# Patient Record
Sex: FEMALE | Race: WHITE | Employment: OTHER | URBAN - METROPOLITAN AREA
[De-identification: names, ages, dates, MRNs, and addresses within clinical notes are randomized per-mention and may not be internally consistent; named-entity substitution may affect disease eponyms.]

---

## 2017-03-06 ENCOUNTER — HOSPITAL ENCOUNTER (OUTPATIENT)
Dept: MAMMOGRAPHY | Facility: CLINIC | Age: 77
Discharge: HOME | End: 2017-03-06
Attending: INTERNAL MEDICINE | Admitting: INTERNAL MEDICINE
Payer: MEDICARE

## 2017-03-06 ENCOUNTER — HOSPITAL ENCOUNTER (OUTPATIENT)
Dept: BONE DENSITY | Facility: CLINIC | Age: 77
End: 2017-03-06
Attending: INTERNAL MEDICINE
Payer: MEDICARE

## 2017-03-06 DIAGNOSIS — M85.88 OTHER SPECIFIED DISORDERS OF BONE DENSITY AND STRUCTURE, OTHER SITE: ICD-10-CM

## 2017-03-06 DIAGNOSIS — Z12.31 VISIT FOR SCREENING MAMMOGRAM: ICD-10-CM

## 2017-03-06 PROCEDURE — 77080 DXA BONE DENSITY AXIAL: CPT

## 2017-03-06 PROCEDURE — G0202 SCR MAMMO BI INCL CAD: HCPCS

## 2018-03-14 ENCOUNTER — HOSPITAL ENCOUNTER (OUTPATIENT)
Dept: MAMMOGRAPHY | Facility: CLINIC | Age: 78
Discharge: HOME | End: 2018-03-14
Attending: INTERNAL MEDICINE | Admitting: INTERNAL MEDICINE
Payer: MEDICARE

## 2018-03-14 DIAGNOSIS — Z12.31 ENCOUNTER FOR SCREENING MAMMOGRAM FOR HIGH-RISK PATIENT: ICD-10-CM

## 2018-03-14 PROCEDURE — 77063 BREAST TOMOSYNTHESIS BI: CPT

## 2019-03-18 ENCOUNTER — HOSPITAL ENCOUNTER (OUTPATIENT)
Dept: MAMMOGRAPHY | Facility: CLINIC | Age: 79
Discharge: HOME | End: 2019-03-18
Attending: INTERNAL MEDICINE | Admitting: INTERNAL MEDICINE
Payer: MEDICARE

## 2019-03-18 DIAGNOSIS — Z12.31 SCREENING MAMMOGRAM, ENCOUNTER FOR: ICD-10-CM

## 2019-03-18 PROCEDURE — 77063 BREAST TOMOSYNTHESIS BI: CPT

## 2020-05-26 ENCOUNTER — HOSPITAL ENCOUNTER (OUTPATIENT)
Dept: MAMMOGRAPHY | Facility: CLINIC | Age: 80
Discharge: HOME | End: 2020-05-26
Attending: INTERNAL MEDICINE | Admitting: INTERNAL MEDICINE
Payer: MEDICARE

## 2020-05-26 DIAGNOSIS — Z12.31 VISIT FOR SCREENING MAMMOGRAM: ICD-10-CM

## 2020-05-26 PROCEDURE — 77067 SCR MAMMO BI INCL CAD: CPT

## 2020-08-10 ENCOUNTER — APPOINTMENT (OUTPATIENT)
Dept: CT IMAGING | Facility: CLINIC | Age: 80
DRG: 164 | End: 2020-08-10
Attending: EMERGENCY MEDICINE
Payer: MEDICARE

## 2020-08-10 ENCOUNTER — APPOINTMENT (OUTPATIENT)
Dept: GENERAL RADIOLOGY | Facility: CLINIC | Age: 80
DRG: 164 | End: 2020-08-10
Attending: EMERGENCY MEDICINE
Payer: MEDICARE

## 2020-08-10 ENCOUNTER — HOSPITAL ENCOUNTER (INPATIENT)
Facility: CLINIC | Age: 80
LOS: 4 days | Discharge: HOME | DRG: 164 | End: 2020-08-14
Attending: EMERGENCY MEDICINE | Admitting: INTERNAL MEDICINE
Payer: MEDICARE

## 2020-08-10 ENCOUNTER — APPOINTMENT (OUTPATIENT)
Dept: ULTRASOUND IMAGING | Facility: CLINIC | Age: 80
DRG: 164 | End: 2020-08-10
Attending: INTERNAL MEDICINE
Payer: MEDICARE

## 2020-08-10 DIAGNOSIS — I82.4Z1 ACUTE DEEP VEIN THROMBOSIS (DVT) OF DISTAL VEIN OF RIGHT LOWER EXTREMITY (H): ICD-10-CM

## 2020-08-10 DIAGNOSIS — R06.09 DYSPNEA ON EXERTION: ICD-10-CM

## 2020-08-10 DIAGNOSIS — I26.99 BILATERAL PULMONARY EMBOLISM (H): Primary | ICD-10-CM

## 2020-08-10 LAB
ANION GAP SERPL CALCULATED.3IONS-SCNC: 5 MMOL/L (ref 3–14)
BASOPHILS # BLD AUTO: 0 10E9/L (ref 0–0.2)
BASOPHILS NFR BLD AUTO: 0.2 %
BUN SERPL-MCNC: 15 MG/DL (ref 7–30)
CALCIUM SERPL-MCNC: 8.1 MG/DL (ref 8.5–10.1)
CHLORIDE SERPL-SCNC: 106 MMOL/L (ref 94–109)
CO2 SERPL-SCNC: 27 MMOL/L (ref 20–32)
CREAT SERPL-MCNC: 0.67 MG/DL (ref 0.52–1.04)
D DIMER PPP FEU-MCNC: 3.8 UG/ML FEU (ref 0–0.5)
DIFFERENTIAL METHOD BLD: ABNORMAL
EOSINOPHIL # BLD AUTO: 0.3 10E9/L (ref 0–0.7)
EOSINOPHIL NFR BLD AUTO: 2.9 %
ERYTHROCYTE [DISTWIDTH] IN BLOOD BY AUTOMATED COUNT: 12.1 % (ref 10–15)
GFR SERPL CREATININE-BSD FRML MDRD: 84 ML/MIN/{1.73_M2}
GLUCOSE SERPL-MCNC: 96 MG/DL (ref 70–99)
HCT VFR BLD AUTO: 36.9 % (ref 35–47)
HGB BLD-MCNC: 12.2 G/DL (ref 11.7–15.7)
IMM GRANULOCYTES # BLD: 0 10E9/L (ref 0–0.4)
IMM GRANULOCYTES NFR BLD: 0.2 %
INTERPRETATION ECG - MUSE: NORMAL
LYMPHOCYTES # BLD AUTO: 1.7 10E9/L (ref 0.8–5.3)
LYMPHOCYTES NFR BLD AUTO: 19.1 %
MCH RBC QN AUTO: 33.2 PG (ref 26.5–33)
MCHC RBC AUTO-ENTMCNC: 33.1 G/DL (ref 31.5–36.5)
MCV RBC AUTO: 101 FL (ref 78–100)
MONOCYTES # BLD AUTO: 0.6 10E9/L (ref 0–1.3)
MONOCYTES NFR BLD AUTO: 6.6 %
NEUTROPHILS # BLD AUTO: 6.1 10E9/L (ref 1.6–8.3)
NEUTROPHILS NFR BLD AUTO: 71 %
NRBC # BLD AUTO: 0 10*3/UL
NRBC BLD AUTO-RTO: 0 /100
NT-PROBNP SERPL-MCNC: 3224 PG/ML (ref 0–1800)
PLATELET # BLD AUTO: 323 10E9/L (ref 150–450)
POTASSIUM SERPL-SCNC: 3.9 MMOL/L (ref 3.4–5.3)
RADIOLOGIST FLAGS: ABNORMAL
RBC # BLD AUTO: 3.67 10E12/L (ref 3.8–5.2)
SODIUM SERPL-SCNC: 138 MMOL/L (ref 133–144)
TROPONIN I SERPL-MCNC: 0.17 UG/L (ref 0–0.04)
WBC # BLD AUTO: 8.6 10E9/L (ref 4–11)

## 2020-08-10 PROCEDURE — 25000128 H RX IP 250 OP 636: Performed by: EMERGENCY MEDICINE

## 2020-08-10 PROCEDURE — 93970 EXTREMITY STUDY: CPT

## 2020-08-10 PROCEDURE — 99223 1ST HOSP IP/OBS HIGH 75: CPT | Mod: AI | Performed by: INTERNAL MEDICINE

## 2020-08-10 PROCEDURE — 96365 THER/PROPH/DIAG IV INF INIT: CPT | Mod: 59

## 2020-08-10 PROCEDURE — 96366 THER/PROPH/DIAG IV INF ADDON: CPT

## 2020-08-10 PROCEDURE — 80048 BASIC METABOLIC PNL TOTAL CA: CPT | Performed by: EMERGENCY MEDICINE

## 2020-08-10 PROCEDURE — 93005 ELECTROCARDIOGRAM TRACING: CPT

## 2020-08-10 PROCEDURE — 96376 TX/PRO/DX INJ SAME DRUG ADON: CPT

## 2020-08-10 PROCEDURE — 85025 COMPLETE CBC W/AUTO DIFF WBC: CPT | Performed by: EMERGENCY MEDICINE

## 2020-08-10 PROCEDURE — 84484 ASSAY OF TROPONIN QUANT: CPT | Performed by: EMERGENCY MEDICINE

## 2020-08-10 PROCEDURE — 12000000 ZZH R&B MED SURG/OB

## 2020-08-10 PROCEDURE — 71046 X-RAY EXAM CHEST 2 VIEWS: CPT

## 2020-08-10 PROCEDURE — 25000125 ZZHC RX 250: Performed by: EMERGENCY MEDICINE

## 2020-08-10 PROCEDURE — 25000132 ZZH RX MED GY IP 250 OP 250 PS 637: Mod: GY | Performed by: EMERGENCY MEDICINE

## 2020-08-10 PROCEDURE — 99285 EMERGENCY DEPT VISIT HI MDM: CPT | Mod: 25

## 2020-08-10 PROCEDURE — 83880 ASSAY OF NATRIURETIC PEPTIDE: CPT | Performed by: EMERGENCY MEDICINE

## 2020-08-10 PROCEDURE — U0003 INFECTIOUS AGENT DETECTION BY NUCLEIC ACID (DNA OR RNA); SEVERE ACUTE RESPIRATORY SYNDROME CORONAVIRUS 2 (SARS-COV-2) (CORONAVIRUS DISEASE [COVID-19]), AMPLIFIED PROBE TECHNIQUE, MAKING USE OF HIGH THROUGHPUT TECHNOLOGIES AS DESCRIBED BY CMS-2020-01-R: HCPCS | Performed by: EMERGENCY MEDICINE

## 2020-08-10 PROCEDURE — 71275 CT ANGIOGRAPHY CHEST: CPT

## 2020-08-10 PROCEDURE — 85379 FIBRIN DEGRADATION QUANT: CPT | Performed by: EMERGENCY MEDICINE

## 2020-08-10 RX ORDER — MECLIZINE HYDROCHLORIDE 25 MG/1
25 TABLET ORAL 2 TIMES DAILY PRN
COMMUNITY

## 2020-08-10 RX ORDER — CHLORAL HYDRATE 500 MG
1 CAPSULE ORAL DAILY
COMMUNITY

## 2020-08-10 RX ORDER — BUTALBITAL, ACETAMINOPHEN AND CAFFEINE 50; 325; 40 MG/1; MG/1; MG/1
1 TABLET ORAL 3 TIMES DAILY PRN
Status: ON HOLD | COMMUNITY
End: 2020-08-11

## 2020-08-10 RX ORDER — CARVEDILOL 6.25 MG/1
6.25 TABLET ORAL 2 TIMES DAILY WITH MEALS
COMMUNITY

## 2020-08-10 RX ORDER — BUTALBITAL, ACETAMINOPHEN, CAFFEINE AND CODEINE PHOSPHATE 50; 325; 40; 30 MG/1; MG/1; MG/1; MG/1
1 CAPSULE ORAL 2 TIMES DAILY PRN
COMMUNITY

## 2020-08-10 RX ORDER — OXYBUTYNIN CHLORIDE 10 MG/1
10 TABLET, EXTENDED RELEASE ORAL DAILY
COMMUNITY

## 2020-08-10 RX ORDER — ONDANSETRON 4 MG/1
4 TABLET, FILM COATED ORAL EVERY 8 HOURS PRN
COMMUNITY

## 2020-08-10 RX ORDER — ASPIRIN 81 MG/1
324 TABLET, CHEWABLE ORAL ONCE
Status: COMPLETED | OUTPATIENT
Start: 2020-08-10 | End: 2020-08-10

## 2020-08-10 RX ORDER — ATORVASTATIN CALCIUM 10 MG/1
10 TABLET, FILM COATED ORAL DAILY
COMMUNITY

## 2020-08-10 RX ORDER — IOPAMIDOL 755 MG/ML
56 INJECTION, SOLUTION INTRAVASCULAR ONCE
Status: COMPLETED | OUTPATIENT
Start: 2020-08-10 | End: 2020-08-10

## 2020-08-10 RX ORDER — ACETAMINOPHEN 500 MG
500 TABLET ORAL EVERY 6 HOURS PRN
Status: ON HOLD | COMMUNITY
End: 2020-08-11

## 2020-08-10 RX ORDER — AMLODIPINE BESYLATE 5 MG/1
5 TABLET ORAL 2 TIMES DAILY
COMMUNITY

## 2020-08-10 RX ORDER — HEPARIN SODIUM 10000 [USP'U]/100ML
950 INJECTION, SOLUTION INTRAVENOUS CONTINUOUS
Status: DISCONTINUED | OUTPATIENT
Start: 2020-08-10 | End: 2020-08-12

## 2020-08-10 RX ORDER — FAMOTIDINE 40 MG/1
40 TABLET, FILM COATED ORAL DAILY
Status: ON HOLD | COMMUNITY
End: 2020-08-11

## 2020-08-10 RX ADMIN — HEPARIN SODIUM 950 UNITS/HR: 10000 INJECTION, SOLUTION INTRAVENOUS at 21:57

## 2020-08-10 RX ADMIN — SODIUM CHLORIDE 83 ML: 9 INJECTION, SOLUTION INTRAVENOUS at 21:04

## 2020-08-10 RX ADMIN — ASPIRIN 81 MG 324 MG: 81 TABLET ORAL at 20:00

## 2020-08-10 RX ADMIN — Medication 4220 UNITS: at 21:57

## 2020-08-10 RX ADMIN — IOPAMIDOL 56 ML: 755 INJECTION, SOLUTION INTRAVENOUS at 21:04

## 2020-08-10 ASSESSMENT — ENCOUNTER SYMPTOMS
SHORTNESS OF BREATH: 1
COUGH: 0

## 2020-08-11 ENCOUNTER — APPOINTMENT (OUTPATIENT)
Dept: CARDIOLOGY | Facility: CLINIC | Age: 80
DRG: 164 | End: 2020-08-11
Attending: INTERNAL MEDICINE
Payer: MEDICARE

## 2020-08-11 PROBLEM — R06.02 SOB (SHORTNESS OF BREATH): Status: ACTIVE | Noted: 2020-08-11

## 2020-08-11 LAB
ANION GAP SERPL CALCULATED.3IONS-SCNC: 5 MMOL/L (ref 3–14)
BUN SERPL-MCNC: 11 MG/DL (ref 7–30)
CALCIUM SERPL-MCNC: 7.9 MG/DL (ref 8.5–10.1)
CHLORIDE SERPL-SCNC: 109 MMOL/L (ref 94–109)
CO2 SERPL-SCNC: 26 MMOL/L (ref 20–32)
CREAT SERPL-MCNC: 0.57 MG/DL (ref 0.52–1.04)
ERYTHROCYTE [DISTWIDTH] IN BLOOD BY AUTOMATED COUNT: 12.1 % (ref 10–15)
GFR SERPL CREATININE-BSD FRML MDRD: 88 ML/MIN/{1.73_M2}
GLUCOSE SERPL-MCNC: 95 MG/DL (ref 70–99)
HCT VFR BLD AUTO: 35.7 % (ref 35–47)
HGB BLD-MCNC: 11.8 G/DL (ref 11.7–15.7)
LABORATORY COMMENT REPORT: NORMAL
LMWH PPP CHRO-ACNC: 0.57 IU/ML
LMWH PPP CHRO-ACNC: 0.64 IU/ML
LMWH PPP CHRO-ACNC: 0.65 IU/ML
MCH RBC QN AUTO: 33 PG (ref 26.5–33)
MCHC RBC AUTO-ENTMCNC: 33.1 G/DL (ref 31.5–36.5)
MCV RBC AUTO: 100 FL (ref 78–100)
PLATELET # BLD AUTO: 334 10E9/L (ref 150–450)
POTASSIUM SERPL-SCNC: 3.9 MMOL/L (ref 3.4–5.3)
RBC # BLD AUTO: 3.58 10E12/L (ref 3.8–5.2)
SARS-COV-2 RNA SPEC QL NAA+PROBE: NEGATIVE
SARS-COV-2 RNA SPEC QL NAA+PROBE: NORMAL
SARS-COV-2 RNA SPEC QL NAA+PROBE: NOT DETECTED
SODIUM SERPL-SCNC: 140 MMOL/L (ref 133–144)
SPECIMEN SOURCE: NORMAL
WBC # BLD AUTO: 9.6 10E9/L (ref 4–11)

## 2020-08-11 PROCEDURE — 12000000 ZZH R&B MED SURG/OB

## 2020-08-11 PROCEDURE — 99223 1ST HOSP IP/OBS HIGH 75: CPT | Performed by: INTERNAL MEDICINE

## 2020-08-11 PROCEDURE — 85520 HEPARIN ASSAY: CPT | Performed by: EMERGENCY MEDICINE

## 2020-08-11 PROCEDURE — 85027 COMPLETE CBC AUTOMATED: CPT | Performed by: INTERNAL MEDICINE

## 2020-08-11 PROCEDURE — 85520 HEPARIN ASSAY: CPT | Performed by: INTERNAL MEDICINE

## 2020-08-11 PROCEDURE — U0003 INFECTIOUS AGENT DETECTION BY NUCLEIC ACID (DNA OR RNA); SEVERE ACUTE RESPIRATORY SYNDROME CORONAVIRUS 2 (SARS-COV-2) (CORONAVIRUS DISEASE [COVID-19]), AMPLIFIED PROBE TECHNIQUE, MAKING USE OF HIGH THROUGHPUT TECHNOLOGIES AS DESCRIBED BY CMS-2020-01-R: HCPCS | Performed by: PHYSICIAN ASSISTANT

## 2020-08-11 PROCEDURE — 93306 TTE W/DOPPLER COMPLETE: CPT

## 2020-08-11 PROCEDURE — 36415 COLL VENOUS BLD VENIPUNCTURE: CPT | Performed by: INTERNAL MEDICINE

## 2020-08-11 PROCEDURE — 25000132 ZZH RX MED GY IP 250 OP 250 PS 637: Mod: GY | Performed by: INTERNAL MEDICINE

## 2020-08-11 PROCEDURE — 25000128 H RX IP 250 OP 636: Performed by: INTERNAL MEDICINE

## 2020-08-11 PROCEDURE — 80048 BASIC METABOLIC PNL TOTAL CA: CPT | Performed by: INTERNAL MEDICINE

## 2020-08-11 PROCEDURE — 99233 SBSQ HOSP IP/OBS HIGH 50: CPT | Performed by: INTERNAL MEDICINE

## 2020-08-11 PROCEDURE — 93306 TTE W/DOPPLER COMPLETE: CPT | Mod: 26 | Performed by: INTERNAL MEDICINE

## 2020-08-11 RX ORDER — ONDANSETRON 4 MG/1
4 TABLET, ORALLY DISINTEGRATING ORAL EVERY 6 HOURS PRN
Status: DISCONTINUED | OUTPATIENT
Start: 2020-08-11 | End: 2020-08-14 | Stop reason: HOSPADM

## 2020-08-11 RX ORDER — PROCHLORPERAZINE 25 MG
12.5 SUPPOSITORY, RECTAL RECTAL EVERY 12 HOURS PRN
Status: DISCONTINUED | OUTPATIENT
Start: 2020-08-11 | End: 2020-08-14 | Stop reason: HOSPADM

## 2020-08-11 RX ORDER — ONDANSETRON 2 MG/ML
4 INJECTION INTRAMUSCULAR; INTRAVENOUS EVERY 6 HOURS PRN
Status: DISCONTINUED | OUTPATIENT
Start: 2020-08-11 | End: 2020-08-14 | Stop reason: HOSPADM

## 2020-08-11 RX ORDER — NALOXONE HYDROCHLORIDE 0.4 MG/ML
.1-.4 INJECTION, SOLUTION INTRAMUSCULAR; INTRAVENOUS; SUBCUTANEOUS
Status: DISCONTINUED | OUTPATIENT
Start: 2020-08-11 | End: 2020-08-14 | Stop reason: HOSPADM

## 2020-08-11 RX ORDER — ACETAMINOPHEN 325 MG/1
650 TABLET ORAL EVERY 4 HOURS PRN
Status: DISCONTINUED | OUTPATIENT
Start: 2020-08-11 | End: 2020-08-14 | Stop reason: HOSPADM

## 2020-08-11 RX ORDER — PROCHLORPERAZINE MALEATE 5 MG
5 TABLET ORAL EVERY 6 HOURS PRN
Status: DISCONTINUED | OUTPATIENT
Start: 2020-08-11 | End: 2020-08-14 | Stop reason: HOSPADM

## 2020-08-11 RX ORDER — LIDOCAINE 40 MG/G
CREAM TOPICAL
Status: DISCONTINUED | OUTPATIENT
Start: 2020-08-11 | End: 2020-08-14 | Stop reason: HOSPADM

## 2020-08-11 RX ADMIN — ACETAMINOPHEN 650 MG: 325 TABLET, FILM COATED ORAL at 18:30

## 2020-08-11 RX ADMIN — HEPARIN SODIUM 950 UNITS/HR: 10000 INJECTION, SOLUTION INTRAVENOUS at 19:42

## 2020-08-11 ASSESSMENT — ENCOUNTER SYMPTOMS
ABDOMINAL PAIN: 0
FEVER: 1

## 2020-08-11 ASSESSMENT — MIFFLIN-ST. JEOR: SCORE: 1013.4

## 2020-08-11 ASSESSMENT — ACTIVITIES OF DAILY LIVING (ADL)
ADLS_ACUITY_SCORE: 14

## 2020-08-11 NOTE — CONSULTS
[facility]    Vascular Medicine Consultation     Date of Admission:  8/10/2020  Date of Consult (When I saw the patient): 08/11/20    Assessment & Plan   Bilateral pulmonary emboli with right heart strain and right lower extremity DVT in patient with multiple prior superficial venous thromboses     She presented with a 8 day history of shortness of breath on exertion, chest pain and right lower extremity discomfort. Imaging did show bilateral pulmonary emboli with evidence for right heart strain. BNP and troponin were also elevated. An echocardiogram is pending. Ultrasound of the lower extremities was significant for DVT in the right lower extremity from the popliteal vein to calf veins. She was started on IV heparin and admitted to the hospital.     She should continue on IV heparin for now. Await echocardiogram, but it is likely to confirm right heart strain that has been seen on CT and in lab results. As such, it is recommended that she go to Interventional Radiology for IVC filter placement and likely pulmonary artery mechanical thrombectomy. As she is currently hemodynamically stable and her admission COVID test is pending, this will be put on hold until COVID results return. If she becomes unstable prior to results returning, she can be brought down to IR on an expedited basis.     Discussed with her the need for oral anticoagulants. Will have pharmacy run the cost of DOACs. She will need [facility], with removal of her IVC filter in approximately 1-2 months. She and her  had plans to move to [place] at the end of the month. They are debating on postponing this move. She should elevate her right leg and she will need compression stockings upon discharge to be worn during waking hours (20-30 mmHg thigh high).     She has had superficial venous thrombosis in the past, with the first episode being in 1967 after childbirth. She has no  personal or family history of DVT or known clotting disorders. She has not traveled recently or undergone any surgical procedures. She has been rather active with trying to get her condo ready to move out of. She states she is up to date on mammograms and colonoscopies. No thrombophilia evaluation is warranted.      Reason for Consult   Reason for consult: Asked by Dr. Crews to evaluate, provide recommendations on treatment plans, and establish outpatient follow-up in this 79 year old woman admitted with bilateral PE with right heart strain and right lower extremity DVT.     Primary Care Physician   NIXON PANDYA      History of Present Illness   Asher Sandra is a 79-year-old  female with history of superficial thrombophlebitis in the remote past back in the mid-1960s and 1970s when she had 2 pregnancies, but who states she has never had a deep venous thrombosis in her life and no significant family history of deep venous thrombosis who now presents to [facility] with a 1-week history of shortness of breath, chest pain and a 3-day history of right leg pain with workup being positive for bilateral PE and right lower extremity DVT.      On 8/2/20 the patient started having pain in her back that moved over to the central chest area.  The pain was quite severe.  She had contacted EMS, and they came and did an ECG, which apparently was normal.  She was given an option to either go into the hospital or go to her doctor.  The patient elected to follow with her primary care physician, which she did the next day.  During that visit, the patient was given a stronger reflux medication, because she thought her symptoms were more reflux-related.  She did have some blood work done, and on Tuesday, she was called because her CRP was elevated, and that she should go in for a COVID test.  On Wednesday, she did go out for a COVID test, which came back on Saturday as being negative.  The patient on  Thursday started having right leg pain.  She also had fever, low-grade temperature of 102-104, and ongoing shortness of breath.  The patient's leg pain had become constant along with the shortness of breath.  She felt very weak and slept most of the weekend.  She also noted that her blood pressure was lower and her heart rate was higher.  She came to [facility] Emergency Department for further assessment.      In the emergency room, she was afebrile.  Heart rate was 61.  Blood pressure was stable with normal oxygen saturation.  Blood work revealed normal electrolytes, normal kidney function.  ProBNP elevated to 3200.  Troponin was elevated at 0.172.  D-dimer was significantly elevated at 3.8.  A 12-lead ECG showed borderline sinus bradycardia with heart rate of 61.  The patient had some T-wave inversions throughout the anterior leads and some subtle ST depressions on the lateral leads. CT of the chest showed multiple occlusive and nonocclusive pulmonary emboli bilaterally, with greater clot burden on the right compared to the left.  There is prominence of the right heart, suggestive of some degree of associated right heart strain.  The patient was given intravenous heparin and admitted. Venous duplex confirmed right lower extremity DVT in the right popliteal and calf veins. An echocardiogram is pending. COVID testing is also pending.    Past Medical History   1.  History of chronic venous stasis disease.   2.  Fecal incontinence.   3.  GERD.   4.  BPPV.   5.  History of colonic adenoma.     6.  History of uterine prolapse.   7.  Overactive bladder.   8.  Hypercholesterolemia.   9.  Sleep apnea.   10.  Migraines.   11.  Osteoarthritis.   12.  Hypertension.   13.  Osteopenia.   14.  Granulomatous lung disease.   15.  Midline cystocele.   16.  Hyperlipidemia.   17.  Diverticulitis.   18.  DJD.     Past Surgical History   Tonsillectomy, adenoidectomy, colonoscopy, cataract surgery.     Prior to Admission  Medications   Prior to Admission Medications   Prescriptions Last Dose Informant Patient Reported? Taking?   acetaminophen (TYLENOL) 500 MG tablet   Yes Yes   Sig: Take 500 mg by mouth every 6 hours as needed for mild pain   amLODIPine (NORVASC) 5 MG tablet   Yes Yes   Sig: Take 5 mg by mouth 2 times daily   atorvastatin (LIPITOR) 10 MG tablet   Yes Yes   Sig: Take 10 mg by mouth daily   butalbital-APAP-caffeine-codeine (FIORICET WITH CODEINE) -00-30 MG per capsule prn  Yes Yes   Sig: Take 1 capsule by mouth 2 times daily as needed for migraine (chronic pain)   butalbital-acetaminophen-caffeine (ESGIC) -40 MG tablet   Yes Yes   Sig: Take 1 tablet by mouth 3 times daily as needed for headaches or migraine   calcium carbonate 600 mg-vitamin D 400 units (CALTRATE) 600-400 MG-UNIT per tablet   Yes Yes   Sig: Take 1 tablet by mouth 2 times daily   carvedilol (COREG) 6.25 MG tablet   Yes Yes   Sig: Take 6.25 mg by mouth 2 times daily (with meals)   famotidine (PEPCID) 40 MG tablet   Yes Yes   Sig: Take 40 mg by mouth daily   fish oil-omega-3 fatty acids 1000 MG capsule   Yes Yes   Sig: Take 2 g by mouth 2 times daily   meclizine (ANTIVERT) 25 MG tablet   Yes Yes   Sig: Take 25 mg by mouth 2 times daily as needed for dizziness (vertigo)   omeprazole (PRILOSEC) 20 MG DR capsule   Yes Yes   Sig: Take 20 mg by mouth daily before breakfast   ondansetron (ZOFRAN) 4 MG tablet   Yes Yes   Sig: Take 4 mg by mouth every 8 hours as needed (vertigo)   oxybutynin ER (DITROPAN-XL) 10 MG 24 hr tablet   Yes Yes   Sig: Take 10 mg by mouth daily      Facility-Administered Medications: None     Allergies   Allergies   Allergen Reactions     Sulfa Drugs        Social History   Asher Sandra is mrried.  No tobacco, rare alcohol.    Family History   1.  Mother with osteoporosis, macular degeneration, migraines and Alzheimer's.     2.  Father with skin cancer, heart disease, hyperlipidemia, hypertension, stroke and  osteoporosis 3.  Sister with hyperlipidemia, migraines and glaucoma.     Review of Systems   The 10 point Review of Systems is negative other than noted in the HPI or here.     Physical Exam   Temp: 98.5  F (36.9  C) Temp src: Oral BP: 123/61 Pulse: 65 Heart Rate: 101 Resp: 16 SpO2: 92 % O2 Device: None (Room air)    Vital Signs with Ranges  Temp:  [97.8  F (36.6  C)-98.6  F (37  C)] 98.5  F (36.9  C)  Pulse:  [60-65] 65  Heart Rate:  [] 101  Resp:  [8-18] 16  BP: (123-150)/(61-80) 123/61  SpO2:  [92 %-97 %] 92 %  127 lbs 14.4 oz    Constitutional: awake, alert, cooperative, no apparent distress, and appears stated age  Eyes: Lids and lashes normal, pupils equal, round and reactive to light, extra ocular muscles intact, sclera clear, conjunctiva normal  ENT: normocepalic, without obvious abnormality, oropharynx pink and moist  Hematologic / Lymphatic: no lymphadenopathy  Respiratory: No increased work of breathing, good air exchange, clear to auscultation bilaterally, no crackles or wheezing  Cardiovascular: regular rate and rhythm, normal S1 and S2 and no murmur noted  GI: Normal bowel sounds, soft, non-distended, non-tender  Skin: no redness, warmth, or swelling, no rashes and no lesions  Musculoskeletal: There is no redness, warmth, or swelling of the joints.  Full range of motion noted.  Motor strength is 5 out of 5 all extremities bilaterally.  Tone is normal. Mild right lower extremity edema.   Neurologic: Awake, alert, oriented to name, place and time.  Cranial nerves II-XII are grossly intact.  Motor is 5 out of 5 bilaterally.    Neuropsychiatric:  Normal affect, memory, insight.      Data   Most Recent 3 CBC's:  Recent Labs   Lab Test 08/11/20  0436 08/10/20  2001   WBC 9.6 8.6   HGB 11.8 12.2    101*    323     Most Recent 3 BMP's:  Recent Labs   Lab Test 08/11/20  0436 08/10/20  2001    138   POTASSIUM 3.9 3.9   CHLORIDE 109 106   CO2 26 27   BUN 11 15   CR 0.57 0.67   ANIONGAP 5  5   NEAL 7.9* 8.1*   GLC 95 96       Lab Results   Component Value Date    TROPI 0.172 (HH) 08/10/2020     BNP 3,224 on 8/10/20

## 2020-08-11 NOTE — PLAN OF CARE
Patient is A&Ox4. VSS ex hypertensive. Denies pain ex when walking. R PIV infusing heparin at 9.5 ml/hr, recheck at 1100. BLE edema, RLE +3; LLE +2. Up with assist of 1 and gait belt to bathroom, continent. Telemetry read NSR. Calls appropriately.

## 2020-08-11 NOTE — ED PROVIDER NOTES
History     Chief Complaint:  Leg Pain and Shortness of Breath      HPI   Asher Sandra is a 79 year old female with a history of DVT (x2) who presents with right lower leg pain along with shortness of breath, for the past week.  8 days ago, the patient experienced a strong episode of acid reflux. She was evaluated by her PCP given stronger acid reflux medication and her CRP was reported to be a 4.5. On Wednesday, the patient reported a fever of 100.2-100.4 along with shortness of breath and generalized weakness, but no pain. She was advised to get a COVID-19 test done, for which she was swabbed for on Thursday. The results came back negative on Saturday. She has now developed right calf pain with swelling and mild lower right back pain. She has taken Tylenol to alleviate the pain. The leg pain is constant, along with some shortness of breath (worse with exertion). The patient feels an overwhelming sense of fatigue and weakness. The patient denies a cough.    Allergies:  Sulfa drugs  Losartan     Medications:    Zofran  Antivert  Fioricet  Lipitor  Ditropan  Norvasc  Coreg  Pepcid  Omeprazole  Chloroquine phosphate  Ciprofloxacin     Past Medical History:    Venous insufficiency  Incontinence of feces  GERD  Benign paroxysmal positional vertigo  Bilateral vestibular disorder  Colon adenomas  Prolapse of uterus  OAB  Pure hypercholesterolemia  Sleep apnea  Intractable migraine  Primary osteoarthritis  Essential hypertension  Osteopenia of neck of femur  Granulomatous lung disease  Midline cystocele  Hyperlipidemia  Phlebitis  Varicose vein  Diverticulitis  DJD  DVT (x2)    Past Surgical History:    Tosillectomy  Adenoidectomy  Colonoscopy  Cataract extraction    Family History:    Mother - Osteoporosis, Macular degeneration, Migraines, Alzheimer's Disease  Father - Skin Cancer, Heart Disease, Hyperlipidemia, Hypertension, Osteoporosis, Stroke  Sister - Hyperlipidemia, Migraines, High Intraocular  "Pressure    Social History:  The patient was accompanied to the ED by .  Smoking Status: Never Smoker  Smokeless tobacco: Never Used  Alcohol Use: Yes  PCP:  Zachary Tee  Marital Status:   [2]     Review of Systems   Constitutional: Positive for fever.   Respiratory: Positive for shortness of breath. Negative for cough.    Cardiovascular: Positive for leg swelling. Negative for chest pain.   Gastrointestinal: Negative for abdominal pain.   All other systems reviewed and are negative.    Physical Exam     Patient Vitals for the past 24 hrs:   BP Temp Temp src Pulse Heart Rate Resp SpO2 Height Weight   08/11/20 0000 127/77 -- -- 61 60 10 96 % -- --   08/10/20 2300 130/80 -- -- 60 60 8 96 % -- --   08/10/20 2231 123/69 -- -- 61 60 12 96 % -- --   08/10/20 2229 -- -- -- -- 64 14 -- -- --   08/10/20 2124 -- -- -- -- 66 13 95 % 1.575 m (5' 2\") --   08/10/20 2118 130/70 -- -- 61 61 17 97 % -- --   08/10/20 2054 -- -- -- -- -- -- -- -- 56.7 kg (125 lb)   08/10/20 2016 -- -- -- -- 60 12 -- -- --   08/10/20 1948 -- -- -- -- -- -- 97 % -- --   08/10/20 1925 (!) 141/68 97.8  F (36.6  C) Oral 63 -- 18 95 % -- --       Physical Exam  General: Alert and cooperative with exam. Patient in mild distress. Normal mentation.  Head:  Scalp is NC/AT  Eyes:  No scleral icterus, PERRL  ENT:  The external nose and ears are normal. The oropharynx is normal and without erythema; mucus membranes are moist. Uvula midline, no evidence of deep space infection.  Neck:  Normal range of motion without rigidity.  CV:  Regular rate and rhythm    No pathologic murmur   Resp:  Dyspnea with exertion. Breath sounds are clear bilaterally    Non-labored, no retractions or accessory muscle use  GI:  Abdomen is soft, no distension, no tenderness. No peritoneal signs  MS:  Bilateral venous insufficiency with right calf tenderness without overlying skin change or significant swelling.  Skin:  Warm and dry, No rash or lesions " noted.  Neuro: Oriented x 3. No gross motor deficits.    Emergency Department Course     ECG:  ECG taken at 1958, ECG read at 2000 by Juan INGRAM [place]  Normal sinus rhythm  Low voltage QRS  ST & T wave abnormality, consider anterior ischemia  Abnormal ECG  Rate 61 bpm. MN interval 132. QRS duration 90. QT/QTc 448/450. P-R-T axes 51 -21 81.      Imaging:  Radiology findings were communicated with the patient who voiced understanding of the findings.    CT Chest Pulmonary Embolism w contrast   1.  Multiple occlusive and nonocclusive pulmonary emboli are noted  bilaterally, with slightly greater clot burden on the right.  2.  Prominence of the right heart suggests some degree of associated  right heart strain.  3.  Trace left pleural effusion.  [Critical Result: Pulmonary embolism]  Reading per radiology    XR Chest PA & LAT  Small left pleural effusion. The lungs are otherwise  clear. Pulmonary vascularity is within normal limits.  Reading per radiology    US Lower extremity Venous duplex   Calf DVT extending to popliteal; official report pending    Laboratory:  Laboratory findings were communicated with the patient who voiced understanding of the findings.    CBC: AWNL (WBC 8.6, HGB 12.2, )  BMP: Calcium 8.1 (L), o/w WNL (Creatinine 0.67)  D Dimer (Collected 2001): 3.8 (H)  Troponin (Collected 2001): 0.172 (H!)    Nt probnp inpatient: 3224 (H)    Interventions:  2000  mg PO  2157 heparin 25,000 units in 0.45% NaCl 250 mL anticoagulant infusion 950 units/hr IV  2157 heparin anticoagulant 4220 units IV    Emergency Department Course:  Past medical records, nursing notes, and vitals reviewed.    (1932)   I performed an exam of the patient as documented above. History obtained from patient.      EKG obtained in the ED, see results above.     The patient was sent for a CT Chest PE w Contrast and XR Chest, PA & LAT while in the emergency department, results above.     IV was inserted and blood was drawn  for laboratory testing, results above.    (3431)   I spoke with Dr. Crews of the Hospitalist service regarding patient's presentation, findings, and plan of care.     Findings and plan explained to the Patient who consents to admission. Discussed the patient with Dr. Crews, who will admit the patient to a medical telemetry bed for further monitoring, evaluation, and treatment.     I personally reviewed the laboratory and imaging results with the Patient and answered all related questions prior to admission.     Impression & Plan     Medical Decision Making:  Asher Sandra is a 79 year old female who presents for evaluation of shortness of breath.  The differential diagnosis included but was not limited to Pulmonary embolism, pneumothorax, pneumonia, pleurisy, pericarditis, acute coronary syndrome, biliary colic, and  musculoskeletal chest pain.    The workup in the Emergency Department includes the labs as outlined above. EKG shows T wave inversions in the precordial leads; patient without chest pain. CT of the chest reveals extensive pulmonary embolism as noted above with evidence of right heart strain.  The patient is hemodynamically stable and therefore thrombolytics are not indicated.  Labs notable for elevated troponin and BNP secondary to PE.  She was initiated on heparin and did receive aspirin on ED arrival.  Lower extremity ultrasound shows right lower extremity DVT to the popliteal vein and calf.  PESI = 78    Admitted to the hospitalist in stable condition    Diagnosis:     ICD-10-CM    1. Bilateral pulmonary embolism (H)  I26.99    2. Acute deep vein thrombosis (DVT) of distal vein of right lower extremity (H)  I82.4Z1    3. Dyspnea on exertion  R06.00        Plan:   Admit      Scribe Disclosure:  I, Margaret Low, am serving as a scribe at 7:34 PM on 8/10/2020 to document services personally performed by Juan Tinsley DO based on my observations and the provider's statements to me.    8/10/2020    EMERGENCY DEPARTMENT       Juan Tinsley,   08/11/20 0031

## 2020-08-11 NOTE — CONSULTS
Care Transition Initial Assessment - RN        Met with: Patient and spouse at bedside  DATA   Active Problems:    SOB (shortness of breath)       Cognitive Status: awake, alert and oriented.  Primary Care Clinic Name: Dr. Tee  Primary Care MD Name: Cristo ching  Contact information and PCP information verified: Yes  Lives With: spouse   Insurance concerns: No Insurance issues identified  ASSESSMENT  Patient currently receives the following services:  n/a        Identified issues/concerns regarding health management: patient with PMH chronic venous stasis, GERD, colonic adenoma, uterine prolapse, HTN, granulomatous lung disease, admitted on this occasion with increasing SOB, weakness, R. Calf pain extending to chest pain, CT showing multiple bilateral PE's and Right DVT.  Patient pending Vascular consult and PE/DVT recommendations and any additional work up.  Writer met with the patient and spouse at the bedside and went over scope and role of safe discharge planning.  Patient A+Ox4. Patient states she is awaiting her vascular consult and has questions regarding what her activity status should be (she does not want to ambulate due to clot concerns). Writer informed patient and spouse that once vascular comes and makes recommendations we will submit for a RX Liaison for recommendations.  Patient states they live in a condo prior to this admission patient was independent in all cares. Patient wonders if she does have activity restrictions she may require DME such as walker/wheelchair at discharge.  Writer encouraged patient and spouse to ask questions of vascular for activity and restrictions and writer will continue to follow and assist with needed equipment as recommended.    Writer talked about homecare patient and spouse declined at this time.  Patient is aware that writer is available to assist with discharge planning needs and scheduling as recommended.  Will continue to follow for this  stay.  PLAN  Financial costs for the patient include await RX Liaison once vascular puts in recommendations.   Patient given options and choices for discharge yes .  Patient/family is agreeable to the plan?  Yes:   Patient anticipates discharging to home with possible need for DME and follow up scheduling .        Patient anticipates needs for home equipment: Yes  Transportation/person available to transport on day of discharge  is spouse and have they been notified/set up private transport  Plan/Disposition: Home   Appointments:     T/B/D  Care  (CTS) will continue to follow as needed.

## 2020-08-11 NOTE — H&P
Admitted:     08/10/2020      PRIMARY CARE PHYSICIAN:  Zachary Tee MD.      CHIEF COMPLAINT:  Leg pain, shortness of breath.      HISTORY OF PRESENT ILLNESS:  Asher Sandra is a 79-year-old  female with history of thrombophlebitis in the remote past back in the mid-1960s and 1970s when she had 2 pregnancies, but who states she has never had a deep venous thrombosis in her life and no significant family history of deep venous thrombosis, presents to [facility] with a 1-week history of shortness of breath, chest pain and a 3-day history of right leg pain with workup being positive for PE, bilateral.      The patient, on Sunday, while she was walking around, started having pain in her back that moved over to the central chest area.  The pain was quite severe.  She had contacted EMS, and they came and did an ECG, which apparently was normal.  She was given an option to either com into the hospital or go to her doctor.  The patient elected to follow with her primary care physician, which she did the next day.  During that visit, the patient was given a stronger reflux medication, because she thought her symptoms were more reflux-related.  She did have some blood work done, and on Tuesday, she was called because her CRP was elevated, and that she should go in for a COVID test.  On Wednesday, she did go out for COVID test, which came back on Saturday as being negative.  The patient on Thursday started having right leg pain.  She also had fever, low-grade temperature of 102-104, along with her ongoing shortness of breath.  The patient's leg pain is constant along with the shortness of breath.  She is feeling very weak and slept most of the weekend.  She also noted that her blood pressure was lower and her heart rate was higher.  The patient came to [facility] Emergency Department for further assessment.      In the emergency room, the patient was seen by Dr. Shade Tinsley.  The  patient was afebrile.  Heart rate was 61.  Blood pressure was stable, normal oxygen saturation.  Blood work revealed normal electrolytes, normal kidney function.  ProBNP is 3200.  Troponin is elevated at 0.172.  CBC is normal.  Glucose is 96.  D-dimer significantly elevated at 3.8.  A 12-lead ECG showed borderline sinus bradycardia with heart rate of 61.  The patient had some T-wave inversions throughout the anterior leads and some subtle ST depressions on the lateral leads.  She underwent x-ray, 2-view, which shows a small left pleural effusion.  Lungs are otherwise clear.  Pulmonary vasculature is within normal limits.  CT of the chest, however, showed multiple occlusive and nonocclusive pulmonary emboli bilaterally, with greater clot burden on the right compared to the left.  There is prominence of the right heart, suggestive of some degree of associated right heart strain.  The patient was given intravenous heparin and is being admitted for bilateral PE and likely a right lower extremity deep venous thrombosis.      PAST MEDICAL HISTORY:   1.  History of chronic venous stasis disease.   2.  Fecal incontinence.   3.  GERD.   4.  BPPV.   5.  History of colonic adenoma.     6.  History of uterine prolapse.   7.  Overactive bladder.   8.  Hypercholesterolemia.   9.  Sleep apnea.   10.  Migraines.   11.  Osteoarthritis.   12.  Hypertension.   13.  Osteopenia.   14.  Granulomatous lung disease.   15.  Midline cystocele.   16.  Hyperlipidemia.   17.  Diverticulitis.   18.  DJD.      PAST SURGICAL HISTORY:  Tonsillectomy, adenoidectomy, colonoscopy, cataract surgery.      FAMILY HISTORY:   1.  Mother with osteoporosis, macular degeneration, migraines and Alzheimer's.     2.  Father with skin cancer, heart disease, hyperlipidemia, hypertension, stroke and osteoporosis 3.  Sister with hyperlipidemia, migraines and glaucoma.      SOCIAL HISTORY:  .  No tobacco, rare alcohol.  FULL CODE.      ALLERGIES:  SULFA AND  LOSARTAN.      UNVERIFIED CURRENT MEDICATIONS:   1.  Tylenol.   2.  Amlodipine.   3.  Lipitor.   4.  Esgic.   5.  Fioricet.   6.  Calcium.   7.  Coreg.   8.  Pepcid.   9.  Fish oil.   10.  Meclizine   11.  Omeprazole.   12.  Zofran.   13.  Oxybutynin.      REVIEW OF SYSTEMS:  Ten points reviewed and as dictated in History of Present Illness.  Positive for shortness of breath, chest pain and right leg pain.  She felt presyncopal, felt lightheaded, weak, and had a low-grade temperature.  Otherwise, 10-points reviewed.      PHYSICAL EXAMINATION:   VITAL SIGNS:  Temperature 97.8, heart rate 61, respirations 12, blood pressure 122/69, saturations 96% on room air.   GENERAL:  The patient is a very pleasant 79-year-old  female.  She is pleasant, cooperative, in no distress.   HEENT:  Pupils equal.  Sclerae are anicteric.  Mucous membranes are moist.   NECK:  JVP is elevated.   CHEST:  Clear to auscultation.   CARDIOVASCULAR:  S1, S2.  Borderline bradycardic.   GASTROINTESTINAL:  Soft, nontender.  Normoactive bowel sounds.   MUSCULOSKELETAL:  No edema.  Peripheral pulses are palpable, symmetrical.   NEUROLOGIC:  She is grossly nonfocal.  Cranial nerves grossly intact.   SKIN:  Warm, dry, well perfused.   PSYCHIATRIC:  Mood and affect stable.      LABORATORY DATA:  As dictated in the History of Present Illness.      ASSESSMENT:  Asher Sandra is 79 and likely has had pulmonary embolism since likely about a week ago as well as a suspected right lower extremity thrombosis, being admitted with right heart strain, troponin elevation, being admitted as inpatient for further treatment.      PLAN:   1.  Bilateral pulmonary emboli with right heart strain.  The patient will be started on intravenous heparin.  The patient will be monitored on a telemetry bed.  We will obtain an echocardiogram in the morning.  The patient's troponin elevation is likely as a result of her right heart strain.  The patient will likely be on  lifelong anticoagulation.   2.  Rule out right lower extremity deep venous thrombosis.  The patient has pain in her right leg. This has been going on only for the last few days, with her chest pain and shortness of breath preceding that.  We will obtain a bilateral lower extremity ultrasound to look for clot burden, and based on the results, the patient may need no further treatment or may need catheter-directed thrombolysis or an IVC filter if her burden is significant enough.  We will obtain a Vascular Medicine consultation along with the lower extremity ultrasound.   3.  Reflux disease:  We will continue the patient on her PPI once these are verified.   4.  Hypertension:  Given her softer blood pressures, we are going to hold her amlodipine and her Coreg.   5.  History of overactive bladder:  The patient will likely be restarted on her oxybutynin once verified.   6.  Hyperlipidemia:  The patient will be continued on her PPI once verified.   7.  History of migraine headaches, currently stable:  We will make Fioricet available if needed.   8.  Deep venous thrombosis prophylaxis:  The patient is already on anticoagulation.   9.  Code status:  FULL.         VALERY ELAM MD             D: 2020   T: 2020   MT: MAURO      Name:     DOROTHY ATKINS   MRN:      [id number]        Account:      [id number]   :      1940        Admitted:     08/10/2020                   Document: [id number]       cc: Zachary Tee MD

## 2020-08-11 NOTE — PROGRESS NOTES
[facility]    Hospitalist Progress Note    Assessment & Plan   Asher Sandra is 79 and likely has had pulmonary embolism since likely about a week ago as well as a suspected right lower extremity thrombosis, being admitted with right heart strain, troponin elevation, being admitted as inpatient for further treatment.         Bilateral pulmonary emboli with right heart strain.  LE dvt   Hx of multiple prior superficial venous thromboses  -She has had superficial venous thrombosis in the past, with the first episode being in 1967 after childbirth. She has no personal or family history of DVT or known clotting disorders  -no recent travel or surgical procedures.   -8 days of sob, cp and RLE pain  -BNP and troponin elevated  -normal hemodynamics, , nl sats  -nl bmp, cbc  -The patient has pain in her right leg. This has been going on only for the last few days, with her chest pain and shortness of breath preceding that.     The patient's troponin elevation is likely as a result of her right heart strain.   -R LE dopplers show:  Deep venous thrombosis is present within the right lower extremity from popliteal to calf veins.  -vasc med consulted, IR consulted   -Echo pending  -covid pending-rN called lab to have expidited for ivc filter placement    Leg swelling down. Some sob with ambulation. Comfortable. Nl vitals. Not tachy  Echo pending.     Addendum:   Echo shows:   1. Normal biventricular size and function. Left ventricular ejection fraction  of 60-65 %. No segmental wall motion abnormalities noted.  2. The right ventricle is moderately dilated. Mildly decreased right  ventricular systolic function.  3. The left atrium is mildly dilated. The right atrium is severely dilated.  4. There is severe tricuspid regurgitation due to poor coaptation of the Tv leaflets.  5. Right ventricular systolic pressure is elevated, consistent with severe pulmonary hypertension (>55 mmHg).  No prior  study.    Plan in place for am ivc filter and possible thrombectomy in am.   No signs RHF on exam.     Plan:   -IR consult for ivc filter  -may need pulmonary artery thrombectomy  - - started on intravenous heparin.  -telemetry bed.    - echocardiogram    - The patient will likely be on lifelong anticoagulation.     -vasc med consult   -pharm liason consult regarding NOACS  - heparin gtt for now  - follow up vasc med in clinic. Likely removal of ivc filter in 1-2 months  -elevate R leg  -Compression stockings at discharge 20-30mmgH thigh high)  -up to date on mammograms and colonoscopies. No thrombophilia evaluation is warranted    .   Reflux disease:    -We will continue the patient on her PPI once these are verified.     Hypertension:    Given her softer blood pressures, held her amlodipine and her Coreg.  -reeval tomorrow.        History of overactive bladder:  The patient will likely be restarted on her oxybutynin once verified.       Hyperlipidemia:  The patient will be continued on her PPI once verified.      History of migraine headaches, currently stable:  - Fioricet available if needed.     Deep venous thrombosis prophylaxis:  The patient is already on anticoagulation.     Asymptomatic covid testing in process  No covid sxs.       Code status:  FULL.        Sergey Cardenas MD  Text Page  (7am to 6pm)  Interval History   No cp. Some sob when up walking. Leg swelling much better. Not much RLE leg pain  No bleeding hx    -Data reviewed today: I reviewed all new labs and imaging results over the last 24 hours. I personally reviewed labs and imaging since admission.     Physical Exam   Temp: 98.5  F (36.9  C) Temp src: Oral BP: 123/61 Pulse: 65 Heart Rate: 101 Resp: 16 SpO2: 92 % O2 Device: None (Room air)    Vitals:    08/10/20 2054 08/11/20 0036   Weight: 56.7 kg (125 lb) 58 kg (127 lb 14.4 oz)     Vital Signs with Ranges  Temp:  [97.8  F (36.6  C)-98.6  F (37  C)] 98.5  F (36.9  C)  Pulse:  [60-65]  65  Heart Rate:  [] 101  Resp:  [8-18] 16  BP: (123-150)/(61-80) 123/61  SpO2:  [92 %-97 %] 92 %  I/O last 3 completed shifts:  In: -   Out: 250 [Urine:250]    Constitutional: Soft,nad  Neck: nl jvp  Respiratory: Breathing easily, CTAB. No wheeing  Cardiovascular: RRR no r/g/m. Not tachy, good pedal pulses  GI: soft, nt, nd  Skin/Integumen: no rash or edema. RLE slightly larger then LLE. NT calf, no cyanosis.  Psych/Neuro;nl affect, nl speech and menation      Medications     HEParin 950 Units/hr (08/11/20 0507)     - MEDICATION INSTRUCTIONS -         sodium chloride (PF)  3 mL Intracatheter Q8H       Data   Recent Labs   Lab 08/11/20  0436 08/10/20  2001   WBC 9.6 8.6   HGB 11.8 12.2    101*    323    138   POTASSIUM 3.9 3.9   CHLORIDE 109 106   CO2 26 27   BUN 11 15   CR 0.57 0.67   ANIONGAP 5 5   NEAL 7.9* 8.1*   GLC 95 96   TROPI  --  0.172*       Imaging:   Recent Results (from the past 24 hour(s))   Chest XR,  PA & LAT    Narrative    CHEST TWO VIEWS  8/10/2020 8:26 PM     HISTORY:  Shortness of breath.    COMPARISON: None.      Impression    IMPRESSION: Small left pleural effusion. The lungs are otherwise  clear. Pulmonary vascularity is within normal limits.    BOB MADDEN MD   CT Chest Pulmonary Embolism w Contrast   Result Value    Radiologist flags Pulmonary embolism (AA)    Narrative    CT CHEST PULMONARY EMBOLISM WITH CONTRAST  8/10/2020 9:17 PM    CLINICAL HISTORY: Shortness of breath. Elevated d-dimer.  TECHNIQUE: CT angiogram chest during arterial phase injection IV  contrast. 2D and 3D MIP reconstructions were performed by the CT  technologist. Dose reduction techniques were used.     CONTRAST: 56mL Isovue-370    COMPARISON: None.    FINDINGS:  ANGIOGRAM CHEST: There are multiple occlusive and nonocclusive  bilateral pulmonary emboli, slightly more prominent on the right.  Thoracic aorta is suboptimally opacified, but appears negative for  dissection where visualized.  Prominence of the right heart suggests  some degree of associated right heart strain. Atherosclerotic  calcification of the thoracic aorta is noted.    LUNGS AND PLEURA: Trace left pleural effusion. Mild atelectasis at  both lung bases posteriorly.    MEDIASTINUM/AXILLAE: No enlarged lymph nodes are identified in the  chest. No pericardial effusion.    UPPER ABDOMEN: Irregular low-density lesion in the anterior segment of  the right hepatic lobe likely represents a cyst, and measures 1.9 cm.  Limited views of the upper abdomen are otherwise unremarkable.    MUSCULOSKELETAL: Degenerative changes are noted in the thoracic spine.      Impression    IMPRESSION:  1.  Multiple occlusive and nonocclusive pulmonary emboli are noted  bilaterally, with slightly greater clot burden on the right.  2.  Prominence of the right heart suggests some degree of associated  right heart strain.  3.  Trace left pleural effusion.    [Critical Result: Pulmonary embolism]    Finding was identified on 8/10/2020 9:24 PM.     Dr. Dunaway was contacted by me on 8/10/2020 9:31 PM and verbalized  understanding of the critical result.     BOB MADDEN MD   US Lower Extremity Venous Duplex Bilateral    Narrative    EXAM: US LOWER EXTREMITY VENOUS DUPLEX BILATERAL  LOCATION: [facility]  DATE/TIME: 8/10/2020 11:24 PM    INDICATION: Right calf pain, history of pulmonary embolism  COMPARISON: None.  TECHNIQUE: Venous Duplex ultrasound of bilateral lower extremities with and without compression, augmentation and duplex. Color flow and spectral Doppler with waveform analysis performed.    FINDINGS: Exam includes the common femoral, femoral, popliteal veins as well as segmentally visualized deep calf veins and greater saphenous vein.     RIGHT: Thrombus is present within the right leg involving the popliteal, peroneal, posterior tibial veins. Thrombus appears partially occlusive at the level of the popliteal vein, however, occlusive at  the level of the calf veins. No superficial   thrombophlebitis. No popliteal cyst.    LEFT: No deep vein thrombosis. No superficial thrombophlebitis. No popliteal cyst.      Impression    IMPRESSION:  1.  Deep venous thrombosis is present within the right lower extremity from popliteal to calf veins. Results were discussed with Dr. Tinsley at 12:24 AM 08/11/2020.

## 2020-08-11 NOTE — PROGRESS NOTES
RECEIVING UNIT ED HANDOFF REVIEW    ED Nurse Handoff Report was reviewed by: Alexander Rea RN on August 11, 2020 at 12:01 AM

## 2020-08-11 NOTE — PLAN OF CARE
Oriented x4.  VSS but tachycardic at times.  IV in right arm infusing heparin drip at 9.5 ml/hr.  Recheck heparin XA in AM.  A1 assist up to bedside commode or bathroom to void.  +2 edema in right leg, +1 edema in left leg.  No pain at rest.  Right leg elevated on pillows.  Discomfort behind right knee with ambulation, no pain medications needed.  Regular diet, taking it slowly.  Telemetry NSR.  ECHO performed.  Asymptomatic covid test drawn and sent to lab for emergent results (to be sent STAT via justyn to [facility]).  Plan for NPO at midnight and IVC filter placement tomorrow if covid -.   at bedside and supportive of pt.  Nursing will continue to monitor.

## 2020-08-11 NOTE — ED NOTES
"[facility]  ED Nurse Handoff Report    ED Chief complaint: Leg Pain and Shortness of Breath      ED Diagnosis:   Final diagnoses:   None       Code Status: Not on file    Allergies:   Allergies   Allergen Reactions     Sulfa Drugs        Patient Story: Pt presents to the ED via triage with SOB for the last week, a negative Covid 19 tst from last Thursday, and right posterior calf pain.  Pt has a history of DVTs x2.  Focused Assessment:      Treatments and/or interventions provided: Pt vital signs unremarkable, pt reports SOB with activity for the last week and right posterior calf pain.  Troponin elevated at .0172.  CT shows bilateral PEs.  8 G IV placed in right Ac, pt given heparin drip and started on heparin bolus.  Patient's response to treatments and/or interventions: NA    To be done/followed up on inpatient unit:  NA    Does this patient have any cognitive concerns?: NA    Activity level - Baseline/Home:  Independent  Activity Level - Current:   Stand with Assist    Patient's Preferred language: English   Needed?: No    Isolation: None  Infection: Not Applicable  Bariatric?: No    Vital Signs:   Vitals:    08/10/20 2118 08/10/20 2124 08/10/20 2229 08/10/20 2231   BP: 130/70   123/69   Pulse: 61   61   Resp: 17 13 14 12   Temp:       TempSrc:       SpO2: 97% 95%  96%   Weight:       Height:  1.575 m (5' 2\")         Cardiac Rhythm:     Was the PSS-3 completed:   Yes  What interventions are required if any?               Family Comments:  at bedside, involved in cares.  OBS brochure/video discussed/provided to patient/family: N/A              Name of person given brochure if not patient: NA              Relationship to patient: NA    For the majority of the shift this patient's behavior was Green.   Behavioral interventions performed were NA.    ED NURSE PHONE NUMBER: [phone]         "

## 2020-08-11 NOTE — ED TRIAGE NOTES
Pt having pain in her right leg and some SOB had covid test done and got results on Saturday it was negative.

## 2020-08-11 NOTE — PHARMACY-ADMISSION MEDICATION HISTORY
Pharmacy Medication History  Admission medication history interview status for the 8/10/2020  admission is complete. See EPIC admission navigator for prior to admission medications     Medication history sources: Patient and Care Everywhere  Medication history source reliability: Good  Adherence assessment: Good per pt    Significant changes made to the medication list:        Additional medication history information:   ---  Pt was on famotidine 40 mg po daily for reflux but was just changed to omeprazole 20 mg po daily.  The plan to be on omeprazole until her acid reflux is better then go back to famotidine.    Medication reconciliation completed by provider prior to medication history? No    Time spent in this activity: 15 minutes      Prior to Admission medications    Medication Sig Last Dose Taking? Auth Provider   amLODIPine (NORVASC) 5 MG tablet Take 5 mg by mouth 2 times daily  Yes Unknown, Entered By History   atorvastatin (LIPITOR) 10 MG tablet Take 10 mg by mouth daily  Yes Unknown, Entered By History   butalbital-APAP-caffeine-codeine (FIORICET WITH CODEINE) -68-30 MG per capsule Take 1 capsule by mouth 2 times daily as needed for migraine (chronic pain) prn Yes Unknown, Entered By History   calcium carbonate 600 mg-vitamin D 400 units (CALTRATE) 600-400 MG-UNIT per tablet Take 1 tablet by mouth 2 times daily  Yes Unknown, Entered By History   carvedilol (COREG) 6.25 MG tablet Take 6.25 mg by mouth 2 times daily (with meals)  Yes Unknown, Entered By History   fish oil-omega-3 fatty acids 1000 MG capsule Take 1 g by mouth daily  Yes Unknown, Entered By History   meclizine (ANTIVERT) 25 MG tablet Take 25 mg by mouth 2 times daily as needed for dizziness (vertigo)  Yes Unknown, Entered By History   omeprazole (PRILOSEC) 20 MG DR capsule Take 20 mg by mouth daily before breakfast  Yes Unknown, Entered By History   ondansetron (ZOFRAN) 4 MG tablet Take 4 mg by mouth every 8 hours as needed (vertigo)   Yes Unknown, Entered By History   oxybutynin ER (DITROPAN-XL) 10 MG 24 hr tablet Take 10 mg by mouth daily  Yes Unknown, Entered By History

## 2020-08-12 ENCOUNTER — APPOINTMENT (OUTPATIENT)
Dept: INTERVENTIONAL RADIOLOGY/VASCULAR | Facility: CLINIC | Age: 80
DRG: 164 | End: 2020-08-12
Attending: PHYSICIAN ASSISTANT
Payer: MEDICARE

## 2020-08-12 ENCOUNTER — APPOINTMENT (OUTPATIENT)
Dept: INTERVENTIONAL RADIOLOGY/VASCULAR | Facility: CLINIC | Age: 80
DRG: 164 | End: 2020-08-12
Attending: INTERNAL MEDICINE
Payer: MEDICARE

## 2020-08-12 LAB
ANION GAP SERPL CALCULATED.3IONS-SCNC: 4 MMOL/L (ref 3–14)
BUN SERPL-MCNC: 13 MG/DL (ref 7–30)
CALCIUM SERPL-MCNC: 7.8 MG/DL (ref 8.5–10.1)
CHLORIDE SERPL-SCNC: 109 MMOL/L (ref 94–109)
CO2 SERPL-SCNC: 27 MMOL/L (ref 20–32)
CREAT SERPL-MCNC: 0.63 MG/DL (ref 0.52–1.04)
ERYTHROCYTE [DISTWIDTH] IN BLOOD BY AUTOMATED COUNT: 12.4 % (ref 10–15)
GFR SERPL CREATININE-BSD FRML MDRD: 85 ML/MIN/{1.73_M2}
GLUCOSE SERPL-MCNC: 97 MG/DL (ref 70–99)
HCT VFR BLD AUTO: 34.8 % (ref 35–47)
HGB BLD-MCNC: 11.5 G/DL (ref 11.7–15.7)
KCT BLD-ACNC: 255 SEC (ref 75–150)
LMWH PPP CHRO-ACNC: 0.56 IU/ML
MCH RBC QN AUTO: 33 PG (ref 26.5–33)
MCHC RBC AUTO-ENTMCNC: 33 G/DL (ref 31.5–36.5)
MCV RBC AUTO: 100 FL (ref 78–100)
PLATELET # BLD AUTO: 390 10E9/L (ref 150–450)
POTASSIUM SERPL-SCNC: 3.7 MMOL/L (ref 3.4–5.3)
RBC # BLD AUTO: 3.49 10E12/L (ref 3.8–5.2)
SODIUM SERPL-SCNC: 140 MMOL/L (ref 133–144)
WBC # BLD AUTO: 8.1 10E9/L (ref 4–11)

## 2020-08-12 PROCEDURE — 80048 BASIC METABOLIC PNL TOTAL CA: CPT | Performed by: EMERGENCY MEDICINE

## 2020-08-12 PROCEDURE — 27210886 ZZH ACCESSORY CR5

## 2020-08-12 PROCEDURE — C1769 GUIDE WIRE: HCPCS

## 2020-08-12 PROCEDURE — C1757 CATH, THROMBECTOMY/EMBOLECT: HCPCS

## 2020-08-12 PROCEDURE — 02CQ3ZZ EXTIRPATION OF MATTER FROM RIGHT PULMONARY ARTERY, PERCUTANEOUS APPROACH: ICD-10-PCS | Performed by: RADIOLOGY

## 2020-08-12 PROCEDURE — 25000132 ZZH RX MED GY IP 250 OP 250 PS 637: Mod: GY | Performed by: INTERNAL MEDICINE

## 2020-08-12 PROCEDURE — 27210804 ZZH SHEATH CR3

## 2020-08-12 PROCEDURE — C1760 CLOSURE DEV, VASC: HCPCS

## 2020-08-12 PROCEDURE — 99233 SBSQ HOSP IP/OBS HIGH 50: CPT | Performed by: INTERNAL MEDICINE

## 2020-08-12 PROCEDURE — 25000128 H RX IP 250 OP 636: Performed by: RADIOLOGY

## 2020-08-12 PROCEDURE — B31S1ZZ FLUOROSCOPY OF RIGHT PULMONARY ARTERY USING LOW OSMOLAR CONTRAST: ICD-10-PCS | Performed by: RADIOLOGY

## 2020-08-12 PROCEDURE — 36015 PLACE CATHETER IN ARTERY: CPT

## 2020-08-12 PROCEDURE — 25500064 ZZH RX 255 OP 636: Performed by: RADIOLOGY

## 2020-08-12 PROCEDURE — 27210740 ZZH ACCESSORY CR3

## 2020-08-12 PROCEDURE — 06H03DZ INSERTION OF INTRALUMINAL DEVICE INTO INFERIOR VENA CAVA, PERCUTANEOUS APPROACH: ICD-10-PCS | Performed by: RADIOLOGY

## 2020-08-12 PROCEDURE — 37184 PRIM ART M-THRMBC 1ST VSL: CPT

## 2020-08-12 PROCEDURE — 85520 HEPARIN ASSAY: CPT | Performed by: EMERGENCY MEDICINE

## 2020-08-12 PROCEDURE — 27210742 ZZH CATH CR1

## 2020-08-12 PROCEDURE — 02CR3ZZ EXTIRPATION OF MATTER FROM LEFT PULMONARY ARTERY, PERCUTANEOUS APPROACH: ICD-10-PCS | Performed by: RADIOLOGY

## 2020-08-12 PROCEDURE — 25000128 H RX IP 250 OP 636: Performed by: NURSE PRACTITIONER

## 2020-08-12 PROCEDURE — 27211193 ZZ H WOUND GLUE CR1

## 2020-08-12 PROCEDURE — 27210891 ZZH BALLOON (NON-PTA) CR6

## 2020-08-12 PROCEDURE — 37191 INS ENDOVAS VENA CAVA FILTR: CPT

## 2020-08-12 PROCEDURE — 85347 COAGULATION TIME ACTIVATED: CPT

## 2020-08-12 PROCEDURE — C1880 VENA CAVA FILTER: HCPCS

## 2020-08-12 PROCEDURE — B31T1ZZ FLUOROSCOPY OF LEFT PULMONARY ARTERY USING LOW OSMOLAR CONTRAST: ICD-10-PCS | Performed by: RADIOLOGY

## 2020-08-12 PROCEDURE — 25000128 H RX IP 250 OP 636: Performed by: INTERNAL MEDICINE

## 2020-08-12 PROCEDURE — 12000000 ZZH R&B MED SURG/OB

## 2020-08-12 PROCEDURE — 36415 COLL VENOUS BLD VENIPUNCTURE: CPT | Performed by: EMERGENCY MEDICINE

## 2020-08-12 PROCEDURE — 85027 COMPLETE CBC AUTOMATED: CPT | Performed by: EMERGENCY MEDICINE

## 2020-08-12 PROCEDURE — 25000125 ZZHC RX 250: Performed by: NURSE PRACTITIONER

## 2020-08-12 PROCEDURE — 27211192 ZZ H SHEATH CR14

## 2020-08-12 RX ORDER — IODIXANOL 320 MG/ML
150 INJECTION, SOLUTION INTRAVASCULAR ONCE
Status: COMPLETED | OUTPATIENT
Start: 2020-08-12 | End: 2020-08-12

## 2020-08-12 RX ORDER — FLUMAZENIL 0.1 MG/ML
0.2 INJECTION, SOLUTION INTRAVENOUS
Status: DISCONTINUED | OUTPATIENT
Start: 2020-08-12 | End: 2020-08-12 | Stop reason: HOSPADM

## 2020-08-12 RX ORDER — FENTANYL CITRATE 50 UG/ML
25-50 INJECTION, SOLUTION INTRAMUSCULAR; INTRAVENOUS EVERY 5 MIN PRN
Status: DISCONTINUED | OUTPATIENT
Start: 2020-08-12 | End: 2020-08-12 | Stop reason: HOSPADM

## 2020-08-12 RX ORDER — NALOXONE HYDROCHLORIDE 0.4 MG/ML
.1-.4 INJECTION, SOLUTION INTRAMUSCULAR; INTRAVENOUS; SUBCUTANEOUS
Status: DISCONTINUED | OUTPATIENT
Start: 2020-08-12 | End: 2020-08-12 | Stop reason: HOSPADM

## 2020-08-12 RX ORDER — PROTAMINE SULFATE 10 MG/ML
20 INJECTION, SOLUTION INTRAVENOUS ONCE
Status: COMPLETED | OUTPATIENT
Start: 2020-08-12 | End: 2020-08-12

## 2020-08-12 RX ORDER — ATORVASTATIN CALCIUM 10 MG/1
10 TABLET, FILM COATED ORAL DAILY
Status: DISCONTINUED | OUTPATIENT
Start: 2020-08-12 | End: 2020-08-14 | Stop reason: HOSPADM

## 2020-08-12 RX ORDER — OXYBUTYNIN CHLORIDE 10 MG/1
10 TABLET, EXTENDED RELEASE ORAL DAILY
Status: DISCONTINUED | OUTPATIENT
Start: 2020-08-12 | End: 2020-08-14 | Stop reason: HOSPADM

## 2020-08-12 RX ORDER — HEPARIN SODIUM 1000 [USP'U]/ML
500-6000 INJECTION, SOLUTION INTRAVENOUS; SUBCUTANEOUS
Status: COMPLETED | OUTPATIENT
Start: 2020-08-12 | End: 2020-08-12

## 2020-08-12 RX ORDER — HEPARIN SODIUM 10000 [USP'U]/100ML
950 INJECTION, SOLUTION INTRAVENOUS CONTINUOUS
Status: DISCONTINUED | OUTPATIENT
Start: 2020-08-12 | End: 2020-08-13

## 2020-08-12 RX ADMIN — OXYBUTYNIN CHLORIDE 10 MG: 10 TABLET, EXTENDED RELEASE ORAL at 10:15

## 2020-08-12 RX ADMIN — IODIXANOL 111 ML: 320 INJECTION, SOLUTION INTRAVASCULAR at 16:21

## 2020-08-12 RX ADMIN — HEPARIN SODIUM 8000 UNITS: 1000 INJECTION INTRAVENOUS; SUBCUTANEOUS at 14:43

## 2020-08-12 RX ADMIN — FENTANYL CITRATE 25 MCG: 50 INJECTION, SOLUTION INTRAMUSCULAR; INTRAVENOUS at 15:38

## 2020-08-12 RX ADMIN — MIDAZOLAM HYDROCHLORIDE 0.5 MG: 1 INJECTION, SOLUTION INTRAMUSCULAR; INTRAVENOUS at 15:38

## 2020-08-12 RX ADMIN — FENTANYL CITRATE 25 MCG: 50 INJECTION, SOLUTION INTRAMUSCULAR; INTRAVENOUS at 15:46

## 2020-08-12 RX ADMIN — MIDAZOLAM HYDROCHLORIDE 0.5 MG: 1 INJECTION, SOLUTION INTRAMUSCULAR; INTRAVENOUS at 14:58

## 2020-08-12 RX ADMIN — FENTANYL CITRATE 25 MCG: 50 INJECTION, SOLUTION INTRAMUSCULAR; INTRAVENOUS at 14:32

## 2020-08-12 RX ADMIN — ONDANSETRON 4 MG: 4 TABLET, ORALLY DISINTEGRATING ORAL at 01:59

## 2020-08-12 RX ADMIN — PROTAMINE SULFATE 20 MG: 10 INJECTION, SOLUTION INTRAVENOUS at 16:28

## 2020-08-12 RX ADMIN — OMEPRAZOLE 20 MG: 20 CAPSULE, DELAYED RELEASE ORAL at 10:15

## 2020-08-12 RX ADMIN — FENTANYL CITRATE 25 MCG: 50 INJECTION, SOLUTION INTRAMUSCULAR; INTRAVENOUS at 14:57

## 2020-08-12 RX ADMIN — FENTANYL CITRATE 50 MCG: 50 INJECTION, SOLUTION INTRAMUSCULAR; INTRAVENOUS at 14:18

## 2020-08-12 RX ADMIN — MIDAZOLAM HYDROCHLORIDE 0.5 MG: 1 INJECTION, SOLUTION INTRAMUSCULAR; INTRAVENOUS at 15:46

## 2020-08-12 RX ADMIN — FENTANYL CITRATE 25 MCG: 50 INJECTION, SOLUTION INTRAMUSCULAR; INTRAVENOUS at 15:15

## 2020-08-12 RX ADMIN — MIDAZOLAM HYDROCHLORIDE 0.5 MG: 1 INJECTION, SOLUTION INTRAMUSCULAR; INTRAVENOUS at 14:32

## 2020-08-12 RX ADMIN — ACETAMINOPHEN 650 MG: 325 TABLET, FILM COATED ORAL at 22:12

## 2020-08-12 RX ADMIN — MIDAZOLAM HYDROCHLORIDE 1 MG: 1 INJECTION, SOLUTION INTRAMUSCULAR; INTRAVENOUS at 14:18

## 2020-08-12 RX ADMIN — MIDAZOLAM HYDROCHLORIDE 0.5 MG: 1 INJECTION, SOLUTION INTRAMUSCULAR; INTRAVENOUS at 14:44

## 2020-08-12 RX ADMIN — LIDOCAINE HYDROCHLORIDE 9 ML: 10 INJECTION, SOLUTION INFILTRATION; PERINEURAL at 15:52

## 2020-08-12 RX ADMIN — ATORVASTATIN CALCIUM 10 MG: 10 TABLET, FILM COATED ORAL at 10:15

## 2020-08-12 RX ADMIN — MIDAZOLAM HYDROCHLORIDE 0.5 MG: 1 INJECTION, SOLUTION INTRAMUSCULAR; INTRAVENOUS at 15:15

## 2020-08-12 RX ADMIN — HEPARIN SODIUM 950 UNITS/HR: 10000 INJECTION, SOLUTION INTRAVENOUS at 21:12

## 2020-08-12 RX ADMIN — FENTANYL CITRATE 25 MCG: 50 INJECTION, SOLUTION INTRAMUSCULAR; INTRAVENOUS at 14:44

## 2020-08-12 ASSESSMENT — MIFFLIN-ST. JEOR: SCORE: 999.79

## 2020-08-12 ASSESSMENT — ACTIVITIES OF DAILY LIVING (ADL)
ADLS_ACUITY_SCORE: 14

## 2020-08-12 NOTE — PLAN OF CARE
A&Ox4. VSS on RA. Tele: NSR. Echo complete today, see results. C/o pain to R calf & knee, tylenol given x1. R leg w/ +2 edema, elevated. Hep gtt running @9.5ml/hr, hep Xa recheck AM. Covid pending. Plan for NPO at midnight for IVC filter placement tomorrow.

## 2020-08-12 NOTE — PROVIDER NOTIFICATION
MD Notification    Notified Person: MD Cardenas    Notified Person Name:    Notification Date/Time: 8/12/20 1031    Notification Interaction: text page     Purpose of Notification:   IR states that there is no order for an IVC filter placement and are unable to take patient for procedure until they receive an order.     Orders Received:    Comments:

## 2020-08-12 NOTE — PRE-PROCEDURE
GENERAL PRE-PROCEDURE:   Procedure:  Pulmonary angiogram with suction thrombectomy, inferior vena cava filter placement with intravenous moderate sedation   Date/Time:  8/12/2020 1:40 PM    Written consent obtained?: Yes    Risks and benefits: Risks, benefits and alternatives were discussed    Consent given by:  Patient  Patient states understanding of procedure being performed: Yes    Patient's understanding of procedure matches consent: Yes    Procedure consent matches procedure scheduled: Yes    Expected level of sedation:  Moderate  Appropriately NPO:  Yes  ASA Class:  Class 3- Severe systemic disease, definite functional limitations  Mallampati  :  Grade 1- soft palate, uvula, tonsillar pillars, and posterior pharyngeal wall visible  Lungs:  Lungs clear with good breath sounds bilaterally  Heart:  Normal heart sounds and rate  History & Physical reviewed:  History and physical reviewed and no updates needed  Statement of review:  I have reviewed the lab findings, diagnostic data, medications, and the plan for sedation

## 2020-08-12 NOTE — PHARMACY-RX INSURANCE COVERAGE
Anticoagulation coverage check.  Patient has Medicare D through [employer] with $111 (of $435) unmet deductible.    Xarelto/Eliquis  August:  Upon receipt of RX, [facility] can dispense 1 month free.  September: $157 (fulfills $435 deductible)  October-Dec: $46/mo    Pradaxa is non-formulary and more expensive.    Enoxaparin 60mg x 10 syringes: $196    Jantoven (warfarin)  $10/mo      -WILLIE Stinson, Pharmacy Technician/Liaison, [facility] [phone]

## 2020-08-12 NOTE — PLAN OF CARE
A&Ox4.  VSS, RA while awake; saturations 88% while asleep, 1L to keep > 92%.  Complained of R leg pain; controlled this shift with ice/elevation.  Heparin drip infusing @ 9.5 mL/hr.  NPO since midnight for IVC filter placement today; patient verbalized understanding.  Complained of nausea x 1; SL Zofran given with good results.  SBA to bathroom.  TELE: sinus dysrhythmia.  Discharge pending progress.

## 2020-08-12 NOTE — PROGRESS NOTES
Patient is on IR schedule today Wednesday 8/12/2020 for a Pulmonary angiogram and IVC filter placement.   -Labs WNL for procedure.   -Orders for NPO have been entered.   -Consent was obtained from patient Asher and  after explaining the procedure, risks and benefits and consent is in IR.     Please contact the [place] RN [place] [phone] for estimated time of procedure.     Jenny Leitschuh CNP Interventional Radiology ([phone])

## 2020-08-12 NOTE — PROCEDURES
[facility]    Procedure: Pulmonary angiogram.     Date/Time: 8/12/2020 4:46 PM  Performed by: Tana Rea DO  Authorized by: Tana Rea DO     UNIVERSAL PROTOCOL   Site Marked: Yes  Prior Images Obtained and Reviewed:  Yes  Required items: Required blood products, implants, devices and special equipment available    Patient identity confirmed:  Verbally with patient, arm band, provided demographic data and hospital-assigned identification number  Patient was reevaluated immediately before administering moderate or deep sedation or anesthesia  Confirmation Checklist:  Patient's identity using two indicators, relevant allergies, procedure was appropriate and matched the consent or emergent situation and correct equipment/implants were available  Time out: Immediately prior to the procedure a time out was called    Universal Protocol: the Joint Commission Universal Protocol was followed    Preparation: Patient was prepped and draped in usual sterile fashion           ANESTHESIA    Anesthesia: Local infiltration  Local Anesthetic:  Lidocaine 1% without epinephrine      SEDATION    Patient Sedated: Yes    Sedation Type:  Moderate (conscious) sedation  Vital signs: Vital signs monitored during sedation    See dictated procedure note for full details.  Findings: 1. Pulmonary angiogram with mechanical thrombectomy.   Pressures:   Pre:  41/8 (20) mmHg  Post: 23/5 (11) mmHg    2. IVC filter placement.     Specimens: none    Complications: None    Condition: Stable    Plan: Follow up with Dr Rea in clinic in 1 month.    PROCEDURE   Patient Tolerance:  Patient tolerated the procedure well with no immediate complications    Length of time physician/provider present for 1:1 monitoring during sedation: 120

## 2020-08-12 NOTE — PROGRESS NOTES
[facility]    Vascular Medicine Progress Note    Date of Service (when I saw the patient): 08/12/2020     Attestation: I have examined the patient independently of Nia Hanson PA-C and agree with the examination and plan as delineated below.    Juan Bruce MD       Assessment & Plan   Bilateral pulmonary emboli with right heart strain and right lower extremity DVT in patient with multiple prior superficial venous thromboses      Assessment:   -She presented with an 8 day history of shortness of breath on exertion, chest pain and right lower extremity discomfort. Imaging did show bilateral pulmonary emboli with evidence for right heart strain.   -BNP and troponin elevated.   -Echocardiogram with evidence of some right heart strain.   -Ultrasound of the lower extremities was significant for DVT in the right lower extremity from the popliteal vein to calf veins.   -Tolerating IV heparin.  -COVID negative.   -Hemodynamically stable.   -Hx of SVT in past with pregnancies. No prior personal or family hx of DVT/PE. Up to date on cancer screenings. No thrombophilia eval warranted.      Plan:   -Continue IV heparin for now.   -To go to IR for IVC filter placement today and pulmonary artery mechanical thrombectomy.   -Resume IV heparin after IR procedure for one more night. If doing well tomorrow can transition to oral anticoagulation.   -Pharmacy to run the cost of DOACs.   -[facility] will [facility], with removal of her IVC filter in approximately 1-2 months and repeat echo in about 3 months.   -She and her  had plans to move to [place] at the end of the month. They are debating on postponing this move.   -Elevate right leg, utilize compression stockings upon discharge - to be worn during waking hours (20-30 mmHg thigh high).          Interval History   Doing well. Wants to go have IR procedures done. Afraid to ambulate until this is  completed.     Physical Exam   Temp: 98.5  F (36.9  C) Temp src: Oral BP: 130/67 Pulse: 68 Heart Rate: 77 Resp: 16 SpO2: 94 % O2 Device: Nasal cannula Oxygen Delivery: 1 LPM  Vitals:    08/10/20 2054 08/11/20 0036 08/12/20 0453   Weight: 56.7 kg (125 lb) 58 kg (127 lb 14.4 oz) 56.7 kg (124 lb 14.4 oz)     Vital Signs with Ranges  Temp:  [97.9  F (36.6  C)-98.9  F (37.2  C)] 98.5  F (36.9  C)  Pulse:  [67-68] 68  Heart Rate:  [67-77] 77  Resp:  [16-18] 16  BP: (112-147)/(58-76) 130/67  SpO2:  [88 %-96 %] 94 %  I/O last 3 completed shifts:  In: -   Out: 1000 [Urine:1000]    Constitutional: Awake, alert, cooperative, no apparent distress, and appears stated age.  Eyes: Lids and lashes normal, sclera clear, conjunctiva normal.  ENT: Normocephalic, without obvious abnormality, atraumatic, oral pharynx with moist mucus membranes  Respiratory: No increased work of breathing, good air exchange, clear to auscultation bilaterally, no crackles or wheezing.  Cardiovascular: Regular rate and rhythm, normal S1 and S2, no S3 or S4, and no murmur noted.  GI: Normal bowel sounds, soft, non-distended, non-tender  Skin: No bruising or bleeding, normal skin color, texture, turgor, no redness, warmth, or swelling, no rashes, no lesions  Musculoskeletal: There is no redness, warmth, or swelling of the joints.  Mild RLE swelling.   Neurologic: Awake, alert, oriented to name, place and time.  Cranial nerves II-XII are grossly intact.    Neuropsychiatric: Calm, normal eye contact, alert, normal affect, oriented to self, place, time and situation, memory for past and recent events intact and thought process normal.    Medications     HEParin       - MEDICATION INSTRUCTIONS -         atorvastatin  10 mg Oral Daily     omeprazole  20 mg Oral QAM AC     oxybutynin ER  10 mg Oral Daily     sodium chloride (PF)  3 mL Intracatheter Q8H       Data   Recent Labs   Lab 08/12/20  0708 08/11/20  0436 08/10/20  2001   WBC 8.1 9.6 8.6   HGB 11.5* 11.8  12.2    100 101*    334 323    140 138   POTASSIUM 3.7 3.9 3.9   CHLORIDE 109 109 106   CO2 27 26 27   BUN 13 11 15   CR 0.63 0.57 0.67   ANIONGAP 4 5 5   NEAL 7.8* 7.9* 8.1*   GLC 97 95 96   TROPI  --   --  0.172*     Recent Results (from the past 24 hour(s))   Echocardiogram Complete    Narrative    [id number]  [id number]  [id number]  [id number]^PPAA^VALERY^TAYLER           [facility]  Echocardiography Laboratory  [address] [place] [id number]        Name: DOROTHY ATKINS  MRN: [id number]  : 1940  Study Date: 2020 01:50 PM  Age: 79 yrs  Gender: Female  Patient [facility]  Reason For Study: Pulmonary Embolism  Ordering Physician: VALERY ELAM  Referring Physician: Zachary bennett  Performed By: Nadine Acosta     BSA: 1.6 m2  Height: 62 in  Weight: 125 lb  HR: 82  BP: 127/77 mmHg  _____________________________________________________________________________  __        Procedure  Complete Portable Echo Adult.  _____________________________________________________________________________  __        Interpretation Summary     1. Normal biventricular size and function. Left ventricular ejection fraction  of 60-65 %. No segmental wall motion abnormalities noted.  2. The right ventricle is moderately dilated. Mildly decreased right  ventricular systolic function.  3. The left atrium is mildly dilated. The right atrium is severely dilated.  4. There is severe tricuspid regurgitation due to poor coaptation of the Tv  leaflets.  5. Right ventricular systolic pressure is elevated, consistent with severe  pulmonary hypertension (>55 mmHg).  No prior study.  _____________________________________________________________________________  __        Left Ventricle  The left ventricle is normal in size. There is normal left ventricular wall  thickness. Left ventricular systolic function is normal. The visual ejection  fraction is estimated at 60-65%. Grade I or early  diastolic dysfunction. No  regional wall motion abnormalities noted.     Right Ventricle  The right ventricle is moderately dilated. Mildly decreased right ventricular  systolic function.     Atria  The left atrium is mildly dilated. The right atrium is severely dilated. There  is no color Doppler evidence of an atrial shunt.     Mitral Valve  The mitral valve leaflets appear normal. There is no evidence of stenosis,  fluttering, or prolapse. There is trace mitral regurgitation.        Tricuspid Valve  Normal tricuspid valve. There is severe tricuspid regurgitation. Systolic flow  reversal in the hepatic veins. The right ventricular systolic pressure is  approximated at 49.8 mmHg plus the right atrial pressure. Right ventricular  systolic pressure is elevated, consistent with severe pulmonary hypertension.     Aortic Valve  The aortic valve is trileaflet. There is trace aortic regurgitation. No  hemodynamically significant valvular aortic stenosis.     Pulmonic Valve  The pulmonic valve is not well visualized. There is trace pulmonic valvular  regurgitation.     Vessels  The aortic root is normal size. Normal size ascending aorta. IVC diameter >2.1  cm collapsing <50% with sniff suggests a high RA pressure estimated at 15 mmHg  or greater.     Pericardium  There is no pericardial effusion.        Rhythm  Sinus rhythm was noted.  _____________________________________________________________________________  __  MMode/2D Measurements & Calculations  IVSd: 1.0 cm     LVIDd: 4.1 cm  LVIDs: 1.9 cm  LVPWd: 1.0 cm  FS: 53.2 %  LV mass(C)d: 133.5 grams  LV mass(C)dI: 85.3 grams/m2  Ao root diam: 2.9 cm  LA dimension: 2.9 cm  asc Aorta Diam: 3.1 cm  LA/Ao: 1.0  LA Volume (BP): 29.9 ml  LA Volume Index (BP): 19.0 ml/m2  RWT: 0.49           Doppler Measurements & Calculations  MV E max karen: 54.8 cm/sec  MV A max karen: 68.6 cm/sec  MV E/A: 0.80  MV dec time: 0.30 sec  PA acc time: 0.09 sec  TR max karen: 352.9 cm/sec  TR max PG:  49.8 mmHg  E/E' av.2  Lateral E/e': 5.4  Medial E/e': 8.9           _____________________________________________________________________________  __           Report approved by: Henry Cruz 2020 04:01 PM

## 2020-08-12 NOTE — PROGRESS NOTES
[facility]    Hospitalist Progress Note    Assessment & Plan   Asher Sandra is 79 and likely has had pulmonary embolism since likely about a week ago as well as a suspected right lower extremity thrombosis, being admitted with right heart strain, troponin elevation, being admitted as inpatient for further treatment.         Bilateral pulmonary emboli with right heart strain.  LE dvt   Hx of multiple prior superficial venous thromboses  -She has had superficial venous thrombosis in the past, with the first episode being in 1967 after childbirth. She has no personal or family history of DVT or known clotting disorders  -no recent travel or surgical procedures.   -8 days of sob, cp and RLE pain  -BNP and troponin elevated  -normal hemodynamics, , nl sats  -nl bmp, cbc  -The patient has pain in her right leg. This has been going on only for the last few days, with her chest pain and shortness of breath preceding that.     The patient's troponin elevation is likely as a result of her right heart strain.   -R LE dopplers show:  Deep venous thrombosis is present within the right lower extremity from popliteal to calf veins.  -vasc med consulted, IR consulted   -Echo pending  -covid pending-rN called lab to have expidited for ivc filter placement    Leg swelling down. Some sob with ambulation. Comfortable. Nl vitals. Not tachy  Echo pending.     Addendum:   Echo shows:   1. Normal biventricular size and function. Left ventricular ejection fraction  of 60-65 %. No segmental wall motion abnormalities noted.  2. The right ventricle is moderately dilated. Mildly decreased right  ventricular systolic function.  3. The left atrium is mildly dilated. The right atrium is severely dilated.  4. There is severe tricuspid regurgitation due to poor coaptation of the Tv leaflets.  5. Right ventricular systolic pressure is elevated, consistent with severe pulmonary hypertension (>55 mmHg).  No prior  study.    Today:   Mild nausea overnight -transient episode.mild hypoxia to 88% in bed, satting well 1 LnC  Nl vitals and exam   covid testing negative.     Plan in place for ivc filter and possible thrombectomy this am  No signs RHF on exam.       Plan:   -IR consult for ivc filter, may need pulmonary artery thrombectomy  -npo this am  - - started on intravenous heparin.  -telemetry bed.      - The patient will likely be on lifelong anticoagulation.     -vasc med consult   -pharm liason consult regarding NOACS  - heparin gtt for now  - follow up vasc med in clinic. Likely removal of ivc filter in 1-2 months  -elevate R leg  -Compression stockings at discharge 20-30mmgH thigh high)  -up to date on mammograms and colonoscopies. No thrombophilia evaluation is warranted    Addendum; s/p successful Pulmonary angiogram with suction thrombectomy, inferior vena cava filter placement with intravenous moderate sedation     .   Reflux disease:    -We will continue the patient on her PPI once these are verified.     Hypertension:    Given her softer blood pressures, held her amlodipine and her Coreg.  -reeval later today/tomorrow  - follow sbp       History of overactive bladder:  restarted on her oxybutynin      Hyperlipidemia:  The patient will be continued on her PPI once verified.      History of migraine headaches, currently stable:  - Fioricet available if needed.     Deep venous thrombosis prophylaxis:  The patient is already on anticoagulation.     Asymptomatic covid testing in process  No covid sxs.       Code status:  FULL.       Dispo- likely 1-2 days. ivc and possible thrombectomy today.        Sergey Cardenas MD  Text Page  (7am to 6pm)  Interval History   Transient epiosode of feeling sweaty and mild nausea middle of night  No change in breathing. No sob. No cp. Mild R  Leg pain- no change   per RN sats in bed down to 88%. Placed on 1LNC  Feels tired but no different from yesterday    -Data reviewed today: I  reviewed all new labs and imaging results over the last 24 hours. I personally reviewed labs and imaging since admission.     Physical Exam   Temp: 98.1  F (36.7  C) Temp src: Oral BP: (!) 147/76 Pulse: 67 Heart Rate: 71 Resp: 18 SpO2: 96 % O2 Device: Nasal cannula Oxygen Delivery: 1 LPM  Vitals:    08/10/20 2054 20 0036 20 0453   Weight: 56.7 kg (125 lb) 58 kg (127 lb 14.4 oz) 56.7 kg (124 lb 14.4 oz)     Vital Signs with Ranges  Temp:  [97.9  F (36.6  C)-98.9  F (37.2  C)] 98.1  F (36.7  C)  Pulse:  [67] 67  Heart Rate:  [67-72] 71  Resp:  [16-18] 18  BP: (112-147)/(58-76) 147/76  SpO2:  [88 %-96 %] 96 %  I/O last 3 completed shifts:  In: -   Out: 1000 [Urine:1000]    Constitutional: nad   Respiratory: Breathing easily, CTAB. No wheeing  Cardiovascular: RRR no r/g/m. Not tachy, good pedal pulses  GI: soft, nt, nd  Skin/Integumen: no rash or edema. RLE slightly larger then LLE. NT calf, no cyanosis.  Psych/Neuro;nl affect, nl speech and menation      Medications     HEParin 950 Units/hr (202)     - MEDICATION INSTRUCTIONS -         atorvastatin  10 mg Oral Daily     [START ON 2020] omeprazole  20 mg Oral QAM AC     oxybutynin ER  10 mg Oral Daily     sodium chloride (PF)  3 mL Intracatheter Q8H       Data   Recent Labs   Lab 20  0708 20  0436 08/10/20  2001   WBC 8.1 9.6 8.6   HGB 11.5* 11.8 12.2    100 101*    334 323   NA  --  140 138   POTASSIUM  --  3.9 3.9   CHLORIDE  --  109 106   CO2  --  26 27   BUN  --  11 15   CR  --  0.57 0.67   ANIONGAP  --  5 5   NEAL  --  7.9* 8.1*   GLC  --  95 96   TROPI  --   --  0.172*       Imaging:   Recent Results (from the past 24 hour(s))   Echocardiogram Complete    Narrative    [id number]  [id number]  [id number]  [id number]^PAPA^VALERY^TAYLER           [facility]  Echocardiography Laboratory  [address] [place] [id number]        Name: DOROTHY AKTINS  MRN: [id number]  : 1940  Study Date:  08/11/2020 01:50 PM  Age: 79 yrs  Gender: Female  Patient Location: [facility]  Reason For Study: Pulmonary Embolism  Ordering Physician: VALERY ELAM  Referring Physician: Zachary bennett  Performed By: Nadine Acosta     BSA: 1.6 m2  Height: 62 in  Weight: 125 lb  HR: 82  BP: 127/77 mmHg  _____________________________________________________________________________  __        Procedure  Complete Portable Echo Adult.  _____________________________________________________________________________  __        Interpretation Summary     1. Normal biventricular size and function. Left ventricular ejection fraction  of 60-65 %. No segmental wall motion abnormalities noted.  2. The right ventricle is moderately dilated. Mildly decreased right  ventricular systolic function.  3. The left atrium is mildly dilated. The right atrium is severely dilated.  4. There is severe tricuspid regurgitation due to poor coaptation of the Tv  leaflets.  5. Right ventricular systolic pressure is elevated, consistent with severe  pulmonary hypertension (>55 mmHg).  No prior study.  _____________________________________________________________________________  __        Left Ventricle  The left ventricle is normal in size. There is normal left ventricular wall  thickness. Left ventricular systolic function is normal. The visual ejection  fraction is estimated at 60-65%. Grade I or early diastolic dysfunction. No  regional wall motion abnormalities noted.     Right Ventricle  The right ventricle is moderately dilated. Mildly decreased right ventricular  systolic function.     Atria  The left atrium is mildly dilated. The right atrium is severely dilated. There  is no color Doppler evidence of an atrial shunt.     Mitral Valve  The mitral valve leaflets appear normal. There is no evidence of stenosis,  fluttering, or prolapse. There is trace mitral regurgitation.        Tricuspid Valve  Normal tricuspid valve. There is severe tricuspid  regurgitation. Systolic flow  reversal in the hepatic veins. The right ventricular systolic pressure is  approximated at 49.8 mmHg plus the right atrial pressure. Right ventricular  systolic pressure is elevated, consistent with severe pulmonary hypertension.     Aortic Valve  The aortic valve is trileaflet. There is trace aortic regurgitation. No  hemodynamically significant valvular aortic stenosis.     Pulmonic Valve  The pulmonic valve is not well visualized. There is trace pulmonic valvular  regurgitation.     Vessels  The aortic root is normal size. Normal size ascending aorta. IVC diameter >2.1  cm collapsing <50% with sniff suggests a high RA pressure estimated at 15 mmHg  or greater.     Pericardium  There is no pericardial effusion.        Rhythm  Sinus rhythm was noted.  _____________________________________________________________________________  __  MMode/2D Measurements & Calculations  IVSd: 1.0 cm     LVIDd: 4.1 cm  LVIDs: 1.9 cm  LVPWd: 1.0 cm  FS: 53.2 %  LV mass(C)d: 133.5 grams  LV mass(C)dI: 85.3 grams/m2  Ao root diam: 2.9 cm  LA dimension: 2.9 cm  asc Aorta Diam: 3.1 cm  LA/Ao: 1.0  LA Volume (BP): 29.9 ml  LA Volume Index (BP): 19.0 ml/m2  RWT: 0.49           Doppler Measurements & Calculations  MV E max karen: 54.8 cm/sec  MV A max karen: 68.6 cm/sec  MV E/A: 0.80  MV dec time: 0.30 sec  PA acc time: 0.09 sec  TR max karen: 352.9 cm/sec  TR max P.8 mmHg  E/E' av.2  Lateral E/e': 5.4  Medial E/e': 8.9           _____________________________________________________________________________  __           Report approved by: Henry Cruz 2020 04:01 PM

## 2020-08-12 NOTE — IR NOTE
Interventional Radiology Intra-procedural Nursing Note     Patient Name:  Asher Sandra    Medical Record Number: [id number]  Today's Date:  2020  Procedure: Pulmonary Angiogram and Inferior Vena Cava Filter Placement  Start Time: 1427  End of procedure time: 1623    Report given to: Pamela LAUREN, station 88  Time pt departs:  1639       Notes:      Patient brought into IR room # 2 at 1405.      Informed consent obtained by Tana Rea DO.         Allergies   Allergen Reactions     Sulfa Drugs        Labs reviewed:  Results for orders placed or performed during the hospital encounter of 08/10/20 (from the past 24 hour(s))   Echocardiogram Complete    Narrative    [id number]  [id number]  [id number]  [id number]^PAPA^VALERY^TAYLER           [facility]  Echocardiography Laboratory  [address] [place] [address]        Name: ASHER SANDRA  MRN: [id number]  : 1940  Study Date: 2020 01:50 PM  Age: 79 yrs  Gender: Female  Patient Location: [facility]  Reason For Study: Pulmonary Embolism  Ordering Physician: VALERY ELAM  Referring Physician: Zachary bennett  Performed By: Nadine Acosta     BSA: 1.6 m2  Height: 62 in  Weight: 125 lb  HR: 82  BP: 127/77 mmHg  _____________________________________________________________________________  __        Procedure  Complete Portable Echo Adult.  _____________________________________________________________________________  __        Interpretation Summary     1. Normal biventricular size and function. Left ventricular ejection fraction  of 60-65 %. No segmental wall motion abnormalities noted.  2. The right ventricle is moderately dilated. Mildly decreased right  ventricular systolic function.  3. The left atrium is mildly dilated. The right atrium is severely dilated.  4. There is severe tricuspid regurgitation due to poor coaptation of the Tv  leaflets.  5. Right ventricular systolic pressure is elevated, consistent  with severe  pulmonary hypertension (>55 mmHg).  No prior study.  _____________________________________________________________________________  __        Left Ventricle  The left ventricle is normal in size. There is normal left ventricular wall  thickness. Left ventricular systolic function is normal. The visual ejection  fraction is estimated at 60-65%. Grade I or early diastolic dysfunction. No  regional wall motion abnormalities noted.     Right Ventricle  The right ventricle is moderately dilated. Mildly decreased right ventricular  systolic function.     Atria  The left atrium is mildly dilated. The right atrium is severely dilated. There  is no color Doppler evidence of an atrial shunt.     Mitral Valve  The mitral valve leaflets appear normal. There is no evidence of stenosis,  fluttering, or prolapse. There is trace mitral regurgitation.        Tricuspid Valve  Normal tricuspid valve. There is severe tricuspid regurgitation. Systolic flow  reversal in the hepatic veins. The right ventricular systolic pressure is  approximated at 49.8 mmHg plus the right atrial pressure. Right ventricular  systolic pressure is elevated, consistent with severe pulmonary hypertension.     Aortic Valve  The aortic valve is trileaflet. There is trace aortic regurgitation. No  hemodynamically significant valvular aortic stenosis.     Pulmonic Valve  The pulmonic valve is not well visualized. There is trace pulmonic valvular  regurgitation.     Vessels  The aortic root is normal size. Normal size ascending aorta. IVC diameter >2.1  cm collapsing <50% with sniff suggests a high RA pressure estimated at 15 mmHg  or greater.     Pericardium  There is no pericardial effusion.        Rhythm  Sinus rhythm was noted.  _____________________________________________________________________________  __  MMode/2D Measurements & Calculations  IVSd: 1.0 cm     LVIDd: 4.1 cm  LVIDs: 1.9 cm  LVPWd: 1.0 cm  FS: 53.2 %  LV mass(C)d: 133.5  grams  LV mass(C)dI: 85.3 grams/m2  Ao root diam: 2.9 cm  LA dimension: 2.9 cm  asc Aorta Diam: 3.1 cm  LA/Ao: 1.0  LA Volume (BP): 29.9 ml  LA Volume Index (BP): 19.0 ml/m2  RWT: 0.49           Doppler Measurements & Calculations  MV E max karen: 54.8 cm/sec  MV A max karen: 68.6 cm/sec  MV E/A: 0.80  MV dec time: 0.30 sec  PA acc time: 0.09 sec  TR max karen: 352.9 cm/sec  TR max P.8 mmHg  E/E' av.2  Lateral E/e': 5.4  Medial E/e': 8.9           _____________________________________________________________________________  __           Report approved by: Henry Cruz 2020 04:01 PM      Heparin Xa (10a) Level   Result Value Ref Range    Heparin 10A Level 0.56 IU/mL   Basic metabolic panel   Result Value Ref Range    Sodium 140 133 - 144 mmol/L    Potassium 3.7 3.4 - 5.3 mmol/L    Chloride 109 94 - 109 mmol/L    Carbon Dioxide 27 20 - 32 mmol/L    Anion Gap 4 3 - 14 mmol/L    Glucose 97 70 - 99 mg/dL    Urea Nitrogen 13 7 - 30 mg/dL    Creatinine 0.63 0.52 - 1.04 mg/dL    GFR Estimate 85 >60 mL/min/[1.73_m2]    GFR Estimate If Black >90 >60 mL/min/[1.73_m2]    Calcium 7.8 (L) 8.5 - 10.1 mg/dL   CBC with platelets   Result Value Ref Range    WBC 8.1 4.0 - 11.0 10e9/L    RBC Count 3.49 (L) 3.8 - 5.2 10e12/L    Hemoglobin 11.5 (L) 11.7 - 15.7 g/dL    Hematocrit 34.8 (L) 35.0 - 47.0 %     78 - 100 fl    MCH 33.0 26.5 - 33.0 pg    MCHC 33.0 31.5 - 36.5 g/dL    RDW 12.4 10.0 - 15.0 %    Platelet Count 390 150 - 450 10e9/L       Neuro assessment completed and found to be WNL.     Peripheral pulses checked and documented in Epic Flowsheet.     Patient prepped with 2% Chlorhexidine and draped in supine position. VSS. Monitor reads NSR with rate in the 60's.      MD first needle stick time was 1429. A total of 9 ml of 1% lidocaine was used locally at the puncture site.    A 6Fr RFV sheath was placed at 1434, changed out to 24Fr venous sheath at 1445.    Debrief was completed by Tana Rea DO.        Patient received a total of 4 mg Versed and 200 mcg fentanyl for sedation during the procedure.     The patient tolerated the procedure well.     Neuro assessment remained unchanged at procedure end.     A 24Fr RFV sheath was removed at 1615 and Perclose proglides placed and 8Fr angioseal. Bedrest for 6 hours. The site is C/D/I at procedure end without hematoma.       Unique Williamson RN

## 2020-08-13 LAB
ANION GAP SERPL CALCULATED.3IONS-SCNC: 5 MMOL/L (ref 3–14)
BUN SERPL-MCNC: 16 MG/DL (ref 7–30)
CALCIUM SERPL-MCNC: 7.6 MG/DL (ref 8.5–10.1)
CHLORIDE SERPL-SCNC: 108 MMOL/L (ref 94–109)
CO2 SERPL-SCNC: 24 MMOL/L (ref 20–32)
CREAT SERPL-MCNC: 0.69 MG/DL (ref 0.52–1.04)
ERYTHROCYTE [DISTWIDTH] IN BLOOD BY AUTOMATED COUNT: 12.3 % (ref 10–15)
GFR SERPL CREATININE-BSD FRML MDRD: 83 ML/MIN/{1.73_M2}
GLUCOSE SERPL-MCNC: 98 MG/DL (ref 70–99)
HCT VFR BLD AUTO: 28.9 % (ref 35–47)
HGB BLD-MCNC: 9.9 G/DL (ref 11.7–15.7)
LMWH PPP CHRO-ACNC: 0.67 IU/ML
MCH RBC QN AUTO: 34.1 PG (ref 26.5–33)
MCHC RBC AUTO-ENTMCNC: 34.3 G/DL (ref 31.5–36.5)
MCV RBC AUTO: 100 FL (ref 78–100)
PLATELET # BLD AUTO: 361 10E9/L (ref 150–450)
POTASSIUM SERPL-SCNC: 3.8 MMOL/L (ref 3.4–5.3)
RBC # BLD AUTO: 2.9 10E12/L (ref 3.8–5.2)
SODIUM SERPL-SCNC: 137 MMOL/L (ref 133–144)
WBC # BLD AUTO: 9.6 10E9/L (ref 4–11)

## 2020-08-13 PROCEDURE — 36415 COLL VENOUS BLD VENIPUNCTURE: CPT | Performed by: EMERGENCY MEDICINE

## 2020-08-13 PROCEDURE — 85520 HEPARIN ASSAY: CPT | Performed by: EMERGENCY MEDICINE

## 2020-08-13 PROCEDURE — 80048 BASIC METABOLIC PNL TOTAL CA: CPT | Performed by: EMERGENCY MEDICINE

## 2020-08-13 PROCEDURE — 85027 COMPLETE CBC AUTOMATED: CPT | Performed by: EMERGENCY MEDICINE

## 2020-08-13 PROCEDURE — 99233 SBSQ HOSP IP/OBS HIGH 50: CPT | Performed by: INTERNAL MEDICINE

## 2020-08-13 PROCEDURE — 25000132 ZZH RX MED GY IP 250 OP 250 PS 637: Mod: GY | Performed by: INTERNAL MEDICINE

## 2020-08-13 PROCEDURE — 12000000 ZZH R&B MED SURG/OB

## 2020-08-13 PROCEDURE — 25000132 ZZH RX MED GY IP 250 OP 250 PS 637: Mod: GY | Performed by: PHYSICIAN ASSISTANT

## 2020-08-13 PROCEDURE — 99232 SBSQ HOSP IP/OBS MODERATE 35: CPT | Performed by: INTERNAL MEDICINE

## 2020-08-13 RX ADMIN — RIVAROXABAN 15 MG: 15 TABLET, FILM COATED ORAL at 12:19

## 2020-08-13 RX ADMIN — RIVAROXABAN 15 MG: 15 TABLET, FILM COATED ORAL at 18:42

## 2020-08-13 RX ADMIN — ATORVASTATIN CALCIUM 10 MG: 10 TABLET, FILM COATED ORAL at 08:24

## 2020-08-13 RX ADMIN — ACETAMINOPHEN 650 MG: 325 TABLET, FILM COATED ORAL at 17:35

## 2020-08-13 RX ADMIN — OXYBUTYNIN CHLORIDE 10 MG: 10 TABLET, EXTENDED RELEASE ORAL at 08:24

## 2020-08-13 RX ADMIN — OMEPRAZOLE 20 MG: 20 CAPSULE, DELAYED RELEASE ORAL at 07:01

## 2020-08-13 ASSESSMENT — ACTIVITIES OF DAILY LIVING (ADL)
ADLS_ACUITY_SCORE: 14

## 2020-08-13 ASSESSMENT — MIFFLIN-ST. JEOR: SCORE: 1014.31

## 2020-08-13 NOTE — PROGRESS NOTES
"Interventional Radiology Progress Note:  Inpatient at [facility]  Date: 2020   Patient name: Asher Sandra  MRN:[id number]  :  1940    History: Pleasant 80 year old female with bilateral pulmonary emboli with right heart strain and right LE DVT admitted via ER on 8/10/20. Patient underwent bilateral pulmonary artery mechanical thrombectomy and IVC filter placement with Dr Rea on 20.    Interval History: Doing well today. Feeling better. Denies CP or SOB. Leg  with ambulating.    Physical Exam:   Vitals: /60 (BP Location: Right arm)   Pulse 59   Temp 98.2  F (36.8  C) (Oral)   Resp 14   Ht 1.575 m (5' 2\")   Wt 58.1 kg (128 lb 1.6 oz)   SpO2 99%   BMI 23.43 kg/m     General: Stable. In no acute distress.  Neuro: A&O x 3. Moves all extremities equally. Vascular: +2/4 bilateral femoral pulses, +2/4 bilateral dorsalis pedis pulses, +2/4 bilateral  posterior tibial pulses.  Skin: Without excoriations, ecchymosis, erythema, lesions or open sores.  MSK: No gross motor weakness. Sensation intact. Proprioception intact.  Right groin site dressing C/D/I (closed with Perclose, Angioseal and Monocryl).      Labs:  Recent Labs   Lab 20  0558 20  0708 20  0436   HGB 9.9* 11.5* 11.8   WBC 9.6 8.1 9.6     Assessment: s/p pulmonary angiogram, mechanical thrombectomy and IVC filter placement on 20    Plan: Procedure results and images were reviewed with patient and her . Dressing can be removed tomorrow. Anticoagulation per vascular medicine. IR will contact patient to follow up with Dr Rea in clinic in approx 1 month.    20 minutes were spent with patient during today's visit with greater than 50% of the time spent face to face with the patient, in reviewing medical record and images and in counseling and coordinating patient's care.    Amie Parekh PA-C  Interventional Radiology    "

## 2020-08-13 NOTE — PROGRESS NOTES
[facility]    Hospitalist Progress Note    Assessment & Plan   Asher Sandra is 79 and likely has had pulmonary embolism since likely about a week ago as well as a suspected right lower extremity thrombosis, being admitted with right heart strain, troponin elevation, being admitted as inpatient for further treatment.         Bilateral pulmonary emboli with right heart strain patient is status post pulmonary thrombectomy and IVC filter placement on August 12 of 2020 by IR.  FLAKO dvt   Hx of multiple prior superficial venous thromboses  -She has had superficial venous thrombosis in the past, with the first episode being in 1967 after childbirth. She has no personal or family history of DVT or known clotting disorders  -no recent travel or surgical procedures.   -8 days of sob, cp and RLE pain  -BNP and troponin elevated  -normal hemodynamics, , nl sats  -nl bmp, cbc  -The patient has pain in her right leg. This has been going on only for the last few days, with her chest pain and shortness of breath preceding that.     The patient's troponin elevation is likely as a result of her right heart strain.   -R LE dopplers show:  Deep venous thrombosis is present within the right lower extremity from popliteal to calf veins.  -vasc med consulted, IR consulted   -Echo pending  -covid pending-rN called lab to have expidited for ivc filter placement    Leg swelling down. Some sob with ambulation. Comfortable. Nl vitals. Not tachy  Echo pending.     Echo shows:   1. Normal biventricular size and function. Left ventricular ejection fraction  of 60-65 %. No segmental wall motion abnormalities noted.  2. The right ventricle is moderately dilated. Mildly decreased right  ventricular systolic function.  3. The left atrium is mildly dilated. The right atrium is severely dilated.  4. There is severe tricuspid regurgitation due to poor coaptation of the Tv leaflets.  5. Right ventricular systolic pressure is  elevated, consistent with severe pulmonary hypertension (>55 mmHg).  No prior study.  - The patient will likely be on lifelong anticoagulation.     -vasc med consulted and are following  -pharm liason consult regarding NOACS  - heparin gtt for now.  Transition to oral Xarelto today per vascular medicine  - follow up vasc med in clinic. Likely removal of ivc filter in 1-2 months  -elevate R leg  -Compression stockings at discharge 20-30mmgH thigh high)  -up to date on mammograms and colonoscopies. No thrombophilia evaluation is warranted    Acute blood loss anemia;  Most likely from thrombectomy and IVC filter placement  Hemoglobin is down from 11.5-9.9 today  Continue to monitor hemoglobin closely  .   Reflux disease:    -We will continue the patient on her PPI once these are verified.     Hypertension:    Given her softer blood pressures, held her amlodipine and her Coreg.  -reeval later today/tomorrow  - follow sbp       History of overactive bladder:  restarted on her oxybutynin      Hyperlipidemia:  The patient will be continued on her PPI once verified.      History of migraine headaches, currently stable:  - Fioricet available if needed.     Deep venous thrombosis prophylaxis:  The patient is already on anticoagulation.     Asymptomatic covid testing in process  No covid sxs.       Code status:  FULL.       Dispo-most likely home tomorrow if she remains stable       Carole Ryan MD    Interval History   Patient underwent thrombectomy and IVC filter placement yesterday.  Resting comfortably in chair.  Denies any shortness of breath or chest pain currently.     -Data reviewed today: I reviewed all new labs and imaging results over the last 24 hours. I personally reviewed labs and imaging since admission.     Physical Exam   Temp: 97.9  F (36.6  C) Temp src: Oral BP: 117/56 Pulse: 59 Heart Rate: 65 Resp: 16 SpO2: 97 % O2 Device: None (Room air) Oxygen Delivery: 2 LPM  Vitals:    08/11/20 0036 08/12/20 0453  08/13/20 0615   Weight: 58 kg (127 lb 14.4 oz) 56.7 kg (124 lb 14.4 oz) 58.1 kg (128 lb 1.6 oz)     Vital Signs with Ranges  Temp:  [97.5  F (36.4  C)-99.3  F (37.4  C)] 97.9  F (36.6  C)  Pulse:  [56-73] 59  Heart Rate:  [58-71] 65  Resp:  [10-17] 16  BP: ()/(56-86) 117/56  SpO2:  [91 %-99 %] 97 %  I/O last 3 completed shifts:  In: 350 [P.O.:350]  Out: -     Constitutional: nad   Respiratory: Breathing easily, CTAB. No wheeing  Cardiovascular: RRR no r/g/m. Not tachy, good pedal pulses  GI: soft, nt, nd  Skin/Integumen: no rash or edema. RLE slightly larger then LLE. NT calf, no cyanosis.  Psych/Neuro;nl affect, nl speech and menation      Medications     - MEDICATION INSTRUCTIONS -         atorvastatin  10 mg Oral Daily     omeprazole  20 mg Oral QAM AC     oxybutynin ER  10 mg Oral Daily     rivaroxaban ANTICOAGULANT  15 mg Oral BID w/meals     sodium chloride (PF)  3 mL Intracatheter Q8H       Data   Recent Labs   Lab 08/13/20  0558 08/12/20  0708 08/11/20  0436 08/10/20  2001   WBC 9.6 8.1 9.6 8.6   HGB 9.9* 11.5* 11.8 12.2    100 100 101*    390 334 323    140 140 138   POTASSIUM 3.8 3.7 3.9 3.9   CHLORIDE 108 109 109 106   CO2 24 27 26 27   BUN 16 13 11 15   CR 0.69 0.63 0.57 0.67   ANIONGAP 5 4 5 5   NEAL 7.6* 7.8* 7.9* 8.1*   GLC 98 97 95 96   TROPI  --   --   --  0.172*       Imaging:   No results found for this or any previous visit (from the past 24 hour(s)).

## 2020-08-13 NOTE — PLAN OF CARE
Problem: Activity Intolerance  Goal: Enhanced Capacity and Energy  8/13/2020 1714 by Lizeth Rosales, RN  Outcome: Improving   VSS, A/O, ambulating SBA.  Denies SOB.  Some R shoulder pain, pt reports this is due to arthritis and declines intervention.  R PIV SL, Heparin stopped today and transitioned to Xarelto, pt given handout/educational material as this is a new drug.  Incision dressing from IVC filter placement on R hip CDI.  Tele NSR.  Plan for discharge home tomorrow.

## 2020-08-13 NOTE — PROGRESS NOTES
[facility]    Vascular Medicine Progress Note    Date of Service (when I saw the patient): 08/13/2020     Attestation: I have examined the patient independently of Nia Hanson PA-C and agree with the examination and plan as delineated below.    Juan Bruce MD       Assessment & Plan   Bilateral pulmonary emboli with right heart strain and right lower extremity DVT in patient with multiple prior superficial venous thromboses now s/p pulmonary thrombectomy and IVC filter placement 8/12/20     Assessment:   -She presented with an 8 day history of shortness of breath on exertion, chest pain and right lower extremity discomfort. Imaging did show bilateral pulmonary emboli with evidence for right heart strain.   -BNP and troponin elevated.   -Echocardiogram with evidence of some right heart strain.   -Ultrasound of the lower extremities was significant for DVT in the right lower extremity from the popliteal vein to calf veins.   -Tolerating IV heparin.  -COVID negative.   -Hemodynamically stable on room air.   -Hx of SVT in past with pregnancies. No prior personal or family hx of DVT/PE. Up to date on cancer screenings. No thrombophilia eval warranted.   -Hgb down after thrombectomy. This is expected.      Plan:   -Can transition to oral anticoagulation. Patient has elected to do Xarelto - 15 mg BID for 21 days followed by 20 mg daily thereafter.   -Ambulate. Monitor oxygen after ambulation.   -[facility] will [facility], with removal of her IVC filter in approximately 1-2 months and repeat echo in about 3 months. An appointment has been made for her on 8/26/20.   -She and her  had plans to move to [place] at the end of the month. They have decided to postpone this move.   -Elevate right leg, utilize compression stockings upon discharge - to be worn during waking hours (20-30 mmHg thigh high) - prescription given to patient.   -Home  either later today or tomorrow. Vascular Medicine will sign off.    Interval History   Doing well. Breathing feels easier. No complaints.     Physical Exam   Temp: 98.2  F (36.8  C) Temp src: Oral BP: 135/60 Pulse: 59 Heart Rate: 60 Resp: 14 SpO2: 99 % O2 Device: None (Room air) Oxygen Delivery: 2 LPM  Vitals:    08/11/20 0036 08/12/20 0453 08/13/20 0615   Weight: 58 kg (127 lb 14.4 oz) 56.7 kg (124 lb 14.4 oz) 58.1 kg (128 lb 1.6 oz)     Vital Signs with Ranges  Temp:  [97.5  F (36.4  C)-99.3  F (37.4  C)] 98.2  F (36.8  C)  Pulse:  [56-73] 59  Heart Rate:  [58-71] 60  Resp:  [10-17] 14  BP: ()/(60-86) 135/60  SpO2:  [91 %-99 %] 99 %  I/O last 3 completed shifts:  In: 350 [P.O.:350]  Out: -     Constitutional: Awake, alert, cooperative, no apparent distress, and appears stated age.  Eyes: Lids and lashes normal, sclera clear, conjunctiva normal.  ENT: Normocephalic, without obvious abnormality, atraumatic, oral pharynx with moist mucus membranes  Respiratory: No increased work of breathing, good air exchange, clear to auscultation bilaterally, no crackles or wheezing.  Cardiovascular: Regular rate and rhythm, normal S1 and S2, no S3 or S4, and no murmur noted.  GI: Normal bowel sounds, soft, non-distended, non-tender  Skin: No bruising or bleeding, normal skin color, texture, turgor, no redness, warmth, or swelling, no rashes, access site from procedure is c/d/i.   Musculoskeletal: There is no redness, warmth, or swelling of the joints.  Mild RLE swelling.   Neurologic: Awake, alert, oriented to name, place and time.  Cranial nerves II-XII are grossly intact.    Neuropsychiatric: Calm, normal eye contact, alert, normal affect, oriented to self, place, time and situation, memory for past and recent events intact and thought process normal.    Medications     HEParin 950 Units/hr (08/13/20 0824)     - MEDICATION INSTRUCTIONS -         atorvastatin  10 mg Oral Daily     omeprazole  20 mg Oral QAM AC      oxybutynin ER  10 mg Oral Daily     sodium chloride (PF)  3 mL Intracatheter Q8H       Data   Recent Labs   Lab 08/13/20  0558 08/12/20  0708 08/11/20  0436 08/10/20  2001   WBC 9.6 8.1 9.6 8.6   HGB 9.9* 11.5* 11.8 12.2    100 100 101*    390 334 323    140 140 138   POTASSIUM 3.8 3.7 3.9 3.9   CHLORIDE 108 109 109 106   CO2 24 27 26 27   BUN 16 13 11 15   CR 0.69 0.63 0.57 0.67   ANIONGAP 5 4 5 5   NEAL 7.6* 7.8* 7.9* 8.1*   GLC 98 97 95 96   TROPI  --   --   --  0.172*     No results found for this or any previous visit (from the past 24 hour(s)).

## 2020-08-13 NOTE — PLAN OF CARE
POD #0 from a IVC filter placement. A&Ox4. CMS intact, slight RLE edema. Bowel sounds audible, passing flatus, tolerating 2g Na/low fat diet. VSS. Dressing CDI to right groin. Up with SBA after bedrest is completed. Denies chest pain, SOB. C/o right knee discomfort, decreased with cold applied. Pt scoring green on the Aggression Stop Light Tool. Plan bedrest until 2245 tonight.

## 2020-08-13 NOTE — PLAN OF CARE
A&Ox4.  VSS, RA.  Patient maintained bedrest until 2245 last night post IVC filter placement; R groin site covered, CDI; CMS intact, no pain/tenderness/swelling to the site.  Patient complained of back pain while on bedrest; controlled with PRN Tylenol.  Post bedrest, patient ambulated to bathroom; SBA.  PIV infusing heparin @ 9.5 mL/hr.  TELE: SR with occasional PVCs.  Discharge pending progress.

## 2020-08-14 VITALS
DIASTOLIC BLOOD PRESSURE: 59 MMHG | HEART RATE: 59 BPM | SYSTOLIC BLOOD PRESSURE: 119 MMHG | WEIGHT: 125.5 LBS | TEMPERATURE: 96.8 F | BODY MASS INDEX: 23.1 KG/M2 | OXYGEN SATURATION: 95 % | HEIGHT: 62 IN | RESPIRATION RATE: 16 BRPM

## 2020-08-14 LAB
HGB BLD-MCNC: 9.8 G/DL (ref 11.7–15.7)
LMWH PPP CHRO-ACNC: >2 IU/ML

## 2020-08-14 PROCEDURE — 25000132 ZZH RX MED GY IP 250 OP 250 PS 637: Mod: GY | Performed by: PHYSICIAN ASSISTANT

## 2020-08-14 PROCEDURE — 85520 HEPARIN ASSAY: CPT | Performed by: EMERGENCY MEDICINE

## 2020-08-14 PROCEDURE — 85018 HEMOGLOBIN: CPT | Performed by: STUDENT IN AN ORGANIZED HEALTH CARE EDUCATION/TRAINING PROGRAM

## 2020-08-14 PROCEDURE — 25000132 ZZH RX MED GY IP 250 OP 250 PS 637: Mod: GY | Performed by: INTERNAL MEDICINE

## 2020-08-14 PROCEDURE — 36415 COLL VENOUS BLD VENIPUNCTURE: CPT | Performed by: STUDENT IN AN ORGANIZED HEALTH CARE EDUCATION/TRAINING PROGRAM

## 2020-08-14 PROCEDURE — 36415 COLL VENOUS BLD VENIPUNCTURE: CPT | Performed by: EMERGENCY MEDICINE

## 2020-08-14 PROCEDURE — 99239 HOSP IP/OBS DSCHRG MGMT >30: CPT | Performed by: STUDENT IN AN ORGANIZED HEALTH CARE EDUCATION/TRAINING PROGRAM

## 2020-08-14 RX ADMIN — ATORVASTATIN CALCIUM 10 MG: 10 TABLET, FILM COATED ORAL at 08:34

## 2020-08-14 RX ADMIN — ACETAMINOPHEN 650 MG: 325 TABLET, FILM COATED ORAL at 07:03

## 2020-08-14 RX ADMIN — OXYBUTYNIN CHLORIDE 10 MG: 10 TABLET, EXTENDED RELEASE ORAL at 08:34

## 2020-08-14 RX ADMIN — Medication 1 MG: at 00:12

## 2020-08-14 RX ADMIN — RIVAROXABAN 15 MG: 15 TABLET, FILM COATED ORAL at 08:34

## 2020-08-14 RX ADMIN — OMEPRAZOLE 20 MG: 20 CAPSULE, DELAYED RELEASE ORAL at 07:03

## 2020-08-14 ASSESSMENT — ACTIVITIES OF DAILY LIVING (ADL)
ADLS_ACUITY_SCORE: 12

## 2020-08-14 ASSESSMENT — MIFFLIN-ST. JEOR: SCORE: 997.51

## 2020-08-14 NOTE — PROVIDER NOTIFICATION
"MD Notification    Notified Person: MD    Notified Person Name: Tabby    Notification Date/Time: 08/14/20 11:36 AM    Notification Interaction: online paging    Purpose of Notification:  \"FYI hgb back: 9.8, discharge?\"    Orders Received: discharge orders added    Comments:  "

## 2020-08-14 NOTE — DISCHARGE INSTRUCTIONS
Anticoagulation coverage check.  Patient has Medicare D through [employer] with $111 (of $435) unmet deductible.     Xarelto/Eliquis  August:  Upon receipt of RX, Discharge Pharmacy can dispense 1 month free.  September: $157 (fulfills $435 deductible)  October-Dec: $46/mo

## 2020-08-14 NOTE — DISCHARGE SUMMARY
[facility]  Hospitalist Discharge Summary      Date of Admission:  8/10/2020  Date of Discharge:  8/14/2020  2:56 PM  Discharging Provider: Steph Mcnair MD      Discharge Diagnoses   Bilateral pulmonary emboli with acute corpulmonale s/p thrombectomy and IVC filter placement   LE DVT   HX of prior superficial venous thromboses  Acute blood loss anemia  Reflux  Hypertension  Overactive bladder   Hyperlipidemia  Migraine headaches     Follow-ups Needed After Discharge   - Follow-up with PCP in 1 week   - Follow-up with IR in 1 month for IVC removal      Unresulted Labs Ordered in the Past 30 Days of this Admission     No orders found from 7/11/2020 to 8/11/2020.      These results will be followed up by NA    Discharge Disposition   Discharged to home  Condition at discharge: Stable    Hospital Course   Bilateral pulmonary emboli with acute cor pulmonale s/p thrombectomy and IVC filter placement   LE DVT   HX of prior superficial venous thromboses  Pt presented with 8 days of SOB, CP and RLE pain. Found to have RLE DVT and large pulmonary embolism with evidence of right heart strain on echocardiogram. IR consulted and pt underwent bilateral pulmonary artery mechanical thrombectomy and IVC filter placement on 8/12/2020. No clear precipitating factors for blood clot. Initially treated with IV heparin, but switched to xarelto at discharge. Will need life-long anticoagulation   - Xarelto 15mg BID for 20 more days, then 20mg daily thereafter   - follow-up with PCP in 1 week  - Follow-up with IR in 1 month for IVC removal.     Acute blood loss anemia  Hgb dropped from 11 to 9, secondary to interventional procedure. Stable on re-check.     Reflux  - continue PTA PPI    Hypertension  -Resumed PTA amlodipine and carvedilol     Overactive bladder   - Continue oxybutynin    Hyperlipidemia  Migraine headaches       Consultations This Hospital Stay   PHARMACY TO DOSE [facility]  [facility] IP  CONSULT  CARE TRANSITION RN/SW IP CONSULT  PHARMACY TO DOSE [facility]  PHARMACY LIAISON FOR MEDICATION COVERAGE CONSULT    Code Status   Full Code    Time Spent on this Encounter   I, Steph Mcnair MD, personally saw the patient today and spent greater than 30 minutes discharging this patient.       Steph Mcnair MD  [facility]  ______________________________________________________________________    Physical Exam   Vital Signs: Temp: 98.3  F (36.8  C) Temp src: Oral BP: 125/67   Heart Rate: 60 Resp: 16 SpO2: 97 % O2 Device: None (Room air)    Weight: 125 lbs 8 oz  General Appearance: Awake, alert, oriented, NAD, seen walking around the hallway without difficulty  Respiratory: Clear to auscultation bilaterally   Cardiovascular: RRR, no Murmurs   GI: soft, non-distended  Skin: No rashes or leisons       Primary Care Physician   NIXON PANDYA    Discharge Orders   No discharge procedures on file.    Significant Results and Procedures   Most Recent 3 CBC's:  Recent Labs   Lab Test 08/14/20  1003 08/13/20  0558 08/12/20  0708 08/11/20  0436   WBC  --  9.6 8.1 9.6   HGB 9.8* 9.9* 11.5* 11.8   MCV  --  100 100 100   PLT  --  361 390 334     Most Recent 3 BMP's:  Recent Labs   Lab Test 08/13/20  0558 08/12/20  0708 08/11/20  0436    140 140   POTASSIUM 3.8 3.7 3.9   CHLORIDE 108 109 109   CO2 24 27 26   BUN 16 13 11   CR 0.69 0.63 0.57   ANIONGAP 5 4 5   NEAL 7.6* 7.8* 7.9*   GLC 98 97 95   ,   Results for orders placed or performed during the hospital encounter of 08/10/20   Chest XR,  PA & LAT    Narrative    CHEST TWO VIEWS  8/10/2020 8:26 PM     HISTORY:  Shortness of breath.    COMPARISON: None.      Impression    IMPRESSION: Small left pleural effusion. The lungs are otherwise  clear. Pulmonary vascularity is within normal limits.    BOB MADDEN MD   CT Chest Pulmonary Embolism w Contrast     Value    Radiologist flags Pulmonary embolism (AA)    Narrative    CT CHEST PULMONARY  EMBOLISM WITH CONTRAST  8/10/2020 9:17 PM    CLINICAL HISTORY: Shortness of breath. Elevated d-dimer.  TECHNIQUE: CT angiogram chest during arterial phase injection IV  contrast. 2D and 3D MIP reconstructions were performed by the CT  technologist. Dose reduction techniques were used.     CONTRAST: 56mL Isovue-370    COMPARISON: None.    FINDINGS:  ANGIOGRAM CHEST: There are multiple occlusive and nonocclusive  bilateral pulmonary emboli, slightly more prominent on the right.  Thoracic aorta is suboptimally opacified, but appears negative for  dissection where visualized. Prominence of the right heart suggests  some degree of associated right heart strain. Atherosclerotic  calcification of the thoracic aorta is noted.    LUNGS AND PLEURA: Trace left pleural effusion. Mild atelectasis at  both lung bases posteriorly.    MEDIASTINUM/AXILLAE: No enlarged lymph nodes are identified in the  chest. No pericardial effusion.    UPPER ABDOMEN: Irregular low-density lesion in the anterior segment of  the right hepatic lobe likely represents a cyst, and measures 1.9 cm.  Limited views of the upper abdomen are otherwise unremarkable.    MUSCULOSKELETAL: Degenerative changes are noted in the thoracic spine.      Impression    IMPRESSION:  1.  Multiple occlusive and nonocclusive pulmonary emboli are noted  bilaterally, with slightly greater clot burden on the right.  2.  Prominence of the right heart suggests some degree of associated  right heart strain.  3.  Trace left pleural effusion.    [Critical Result: Pulmonary embolism]    Finding was identified on 8/10/2020 9:24 PM.     Dr. Dunaway was contacted by me on 8/10/2020 9:31 PM and verbalized  understanding of the critical result.     BOB MADDEN MD   US Lower Extremity Venous Duplex Bilateral    Narrative    EXAM: US LOWER EXTREMITY VENOUS DUPLEX BILATERAL  LOCATION: [facility]  DATE/TIME: 8/10/2020 11:24 PM    INDICATION: Right calf pain, history of  pulmonary embolism  COMPARISON: None.  TECHNIQUE: Venous Duplex ultrasound of bilateral lower extremities with and without compression, augmentation and duplex. Color flow and spectral Doppler with waveform analysis performed.    FINDINGS: Exam includes the common femoral, femoral, popliteal veins as well as segmentally visualized deep calf veins and greater saphenous vein.     RIGHT: Thrombus is present within the right leg involving the popliteal, peroneal, posterior tibial veins. Thrombus appears partially occlusive at the level of the popliteal vein, however, occlusive at the level of the calf veins. No superficial   thrombophlebitis. No popliteal cyst.    LEFT: No deep vein thrombosis. No superficial thrombophlebitis. No popliteal cyst.      Impression    IMPRESSION:  1.  Deep venous thrombosis is present within the right lower extremity from popliteal to calf veins. Results were discussed with Dr. Tinsley at 12:24 AM 08/11/2020.   IR IVC Filter Placement    Narrative    INTERVENTIONAL RADIOLOGY PULMONARY ANGIOGRAM BILATERAL, INTERVENTIONAL  RADIOLOGY INFERIOR VENA CAVA FILTER PLACEMENT   8/12/2020 4:20 PM     CLINICAL HISTORY/INDICATION: Pulmonary embolism, right heart strain.    PROCEDURES PERFORMED:   1. Ultrasound-guided right common femoral venotomy.  2. Right and left main pulmonary angiography with pressures.  3. Bilateral pulmonary artery mechanical thrombectomy with post  thrombectomy angiography.  4. Placement of an ALN temporary IVC filter.    MEDICATIONS: 1% lidocaine, 4 mg Versed IV, 200 mcg Fentanyl IV and  8000 units heparin IV.    CONTRAST: 111 mL Visipaque    FLUORO: 22.9 minutes    AIR KERMA: 59.84 mGy    CONSENT: Following a discussion of the risks, benefits, indications  and alternatives to treatment, appropriate informed consent was  obtained.      TIMEOUT: A timeout was performed per universal protocol policy to  ensure correct patient, site, and procedure to be performed.     PROCEDURE  AND FINDINGS:  Following a discussion of the risks, benefits, indications, and  alternatives to treatment, appropriate informed consent was obtained  from the patient. The patient was brought to the interventional  radiology suite and placed supine on the table. Bilateral groins were  prepped and draped in a sterile fashion.  A timeout was performed per  universal protocol policy to confirm the correct patient, site and  procedure to be performed.    A preliminary ultrasound of the right groin was performed and  demonstrates a patent right common femoral vein. A permanent  ultrasound image was recorded.  Using a combination of fluoroscopy and  ultrasound, an access site was determined.  The overlying skin was  anesthetized with 1% Lidocaine.  Using ultrasound guidance,  access  into the right common femoral vein was obtained with visualization of  needle entry into the vessel. A 0.018 wire was advanced through the  needle and exchange was made for a 5 [place] micropuncture sheath. A  0.035 wire was advanced through the micropuncture sheath and exchange  was made for a 6 [place] vascular sheath. Then 2 Perclose devices were  deployed in staggered fashion in the typical preclose technique.  A  total of 8000 Units Heparin IV was administered to maintain an ACT of  250-300. The tract was dilated and a 24 [place] vascular sheath was  advanced into the inferior vena cava over the wire.    Using a combination of wire and angled pigtail catheter access was  gained into the main pulmonary artery. Using a combination of catheter  and wire access was gained into the left main pulmonary artery.  Digital subtraction angiography was performed, images show a moderate  to large amount of pulmonary embolus in the left lower lobe pulmonary  artery. The pigtail catheter was removed over the wire and exchanged  for the 24 [place] FlowTriever.  Over the wire the medium disc was  advanced over the wire into the left lower lobe pulmonary  artery and  deployed. The discs were then pulled back into the catheter and  removed over the wire. The FlowTriever was then advanced into the left  lower lobe pulmonary artery and mechanical thrombectomy was performed  with removal of a significant amount of pulmonary embolus. Post  mechanical thrombectomy angiography was performed, images show  significant improvement in amount of pulmonary emboli.     Using a combination of catheter and wire access was gained into the  right main pulmonary artery. The FlowTriever was then advanced into  the right main pulmonary artery. Digital subtraction angiography was  performed, images show a large amount of thrombus in the right  pulmonary arteries. The FlowTriever was advanced to the level of the  pulmonary emboli and mechanical thrombectomy was performed with  removal of a significant amount of pulmonary embolus. Post aspiration  angiography was performed, images show significant improvement in the  amount of thrombus.     Pulmonary pressures:  Pre treatment: 41 / 8  (20) mmHg  Post Treatment: 23 / 5 (11) mmHg    IVC FILTER PLACEMENT  The FlowTriever catheter was removed. The vascular sheath was pulled  back into the right common iliac vein. The wire was pulled back  through the pulmonary artery into the IVC.    The pigtail flush catheter was then advanced into the inferior vena  cava at the iliac confluence. An inferior venacavogram was performed  which demonstrates conventional caval anatomy without duplication of  the IVC or the renal veins. The inferior vena cava is normal in  caliber and no thrombus or web is identified.    The flush catheter was removed over a wire and the sheath was  exchanged for the filter delivery sheath. The sheath was advanced to  the suprarenal inferior vena cava, the filter was advanced to the end  of the sheath, and the sheath and filter were retracted as a unit for  delivery in an infrarenal position. The filter was deployed.     The sheath  was removed and the previously placed Perclose sutures were  tied down in standard fashion. There was still a moderate amount of  bleeding, so an 8 [place]kin[place] Angio-Seal was deployed with excellent  hemostasis. Manual compression was held for 10 minutes. The small  incision was closed with a 3-0 Monocryl suture. A sterile dressing was  applied.    Throughout the procedure, the patient was monitored by a radiology  nurse for cardiac rhythm and oxygen saturation which remained stable.  Total moderate sedation time was 116 min. The patient tolerated the  procedure well and left interventional radiology in stable condition.      Impression    IMPRESSION:   1. Successful bilateral pulmonary angiography and mechanical  thrombectomy, with significantly improved pulmonary pressures..  2. Placement of an ALN temporary IVC filter     IR Pulmonary Angiogram Bilateral    Narrative    INTERVENTIONAL RADIOLOGY PULMONARY ANGIOGRAM BILATERAL, INTERVENTIONAL  RADIOLOGY INFERIOR VENA CAVA FILTER PLACEMENT   8/12/2020 4:20 PM     CLINICAL HISTORY/INDICATION: Pulmonary embolism, right heart strain.    PROCEDURES PERFORMED:   1. Ultrasound-guided right common femoral venotomy.  2. Right and left main pulmonary angiography with pressures.  3. Bilateral pulmonary artery mechanical thrombectomy with post  thrombectomy angiography.  4. Placement of an ALN temporary IVC filter.    MEDICATIONS: 1% lidocaine, 4 mg Versed IV, 200 mcg Fentanyl IV and  8000 units heparin IV.    CONTRAST: 111 mL Visipaque    FLUORO: 22.9 minutes    AIR KERMA: 59.84 mGy    CONSENT: Following a discussion of the risks, benefits, indications  and alternatives to treatment, appropriate informed consent was  obtained.      TIMEOUT: A timeout was performed per universal protocol policy to  ensure correct patient, site, and procedure to be performed.     PROCEDURE AND FINDINGS:  Following a discussion of the risks, benefits, indications, and  alternatives to treatment,  appropriate informed consent was obtained  from the patient. The patient was brought to the interventional  radiology suite and placed supine on the table. Bilateral groins were  prepped and draped in a sterile fashion.  A timeout was performed per  universal protocol policy to confirm the correct patient, site and  procedure to be performed.    A preliminary ultrasound of the right groin was performed and  demonstrates a patent right common femoral vein. A permanent  ultrasound image was recorded.  Using a combination of fluoroscopy and  ultrasound, an access site was determined.  The overlying skin was  anesthetized with 1% Lidocaine.  Using ultrasound guidance,  access  into the right common femoral vein was obtained with visualization of  needle entry into the vessel. A 0.018 wire was advanced through the  needle and exchange was made for a 5 Spanish micropuncture sheath. A  0.035 wire was advanced through the micropuncture sheath and exchange  was made for a 6 Spanish vascular sheath. Then 2 Perclose devices were  deployed in staggered fashion in the typical preclose technique.  A  total of 8000 Units Heparin IV was administered to maintain an ACT of  250-300. The tract was dilated and a 24 Spanish vascular sheath was  advanced into the inferior vena cava over the wire.    Using a combination of wire and angled pigtail catheter access was  gained into the main pulmonary artery. Using a combination of catheter  and wire access was gained into the left main pulmonary artery.  Digital subtraction angiography was performed, images show a moderate  to large amount of pulmonary embolus in the left lower lobe pulmonary  artery. The pigtail catheter was removed over the wire and exchanged  for the 24 Spanish FlowTriever.  Over the wire the medium disc was  advanced over the wire into the left lower lobe pulmonary artery and  deployed. The discs were then pulled back into the catheter and  removed over the wire. The  FlowTriever was then advanced into the left  lower lobe pulmonary artery and mechanical thrombectomy was performed  with removal of a significant amount of pulmonary embolus. Post  mechanical thrombectomy angiography was performed, images show  significant improvement in amount of pulmonary emboli.     Using a combination of catheter and wire access was gained into the  right main pulmonary artery. The FlowTriever was then advanced into  the right main pulmonary artery. Digital subtraction angiography was  performed, images show a large amount of thrombus in the right  pulmonary arteries. The FlowTriever was advanced to the level of the  pulmonary emboli and mechanical thrombectomy was performed with  removal of a significant amount of pulmonary embolus. Post aspiration  angiography was performed, images show significant improvement in the  amount of thrombus.     Pulmonary pressures:  Pre treatment: 41 / 8  (20) mmHg  Post Treatment: 23 / 5 (11) mmHg    IVC FILTER PLACEMENT  The FlowTriever catheter was removed. The vascular sheath was pulled  back into the right common iliac vein. The wire was pulled back  through the pulmonary artery into the IVC.    The pigtail flush catheter was then advanced into the inferior vena  cava at the iliac confluence. An inferior venacavogram was performed  which demonstrates conventional caval anatomy without duplication of  the IVC or the renal veins. The inferior vena cava is normal in  caliber and no thrombus or web is identified.    The flush catheter was removed over a wire and the sheath was  exchanged for the filter delivery sheath. The sheath was advanced to  the suprarenal inferior vena cava, the filter was advanced to the end  of the sheath, and the sheath and filter were retracted as a unit for  delivery in an infrarenal position. The filter was deployed.     The sheath was removed and the previously placed Perclose sutures were  tied down in standard fashion. There was  still a moderate amount of  bleeding, so an 8 Serbian Angio-Seal was deployed with excellent  hemostasis. Manual compression was held for 10 minutes. The small  incision was closed with a 3-0 Monocryl suture. A sterile dressing was  applied.    Throughout the procedure, the patient was monitored by a radiology  nurse for cardiac rhythm and oxygen saturation which remained stable.  Total moderate sedation time was 116 min. The patient tolerated the  procedure well and left interventional radiology in stable condition.      Impression    IMPRESSION:   1. Successful bilateral pulmonary angiography and mechanical  thrombectomy, with significantly improved pulmonary pressures..  2. Placement of an ALN temporary IVC filter     Echocardiogram Complete    Narrative    [id number]  [id number]  [id number]  [id number]^[email or web address]^VALERY^TAYLER           [facility]  Echocardiography Laboratory  [address] [place] [address]        Name: DOROTHY ATKINS  MRN: [id number]  : 1940  Study Date: 2020 01:50 PM  Age: 79 yrs  Gender: Female  Patient [facility]  Reason For Study: Pulmonary Embolism  Ordering Physician: VALERY ELAM  Referring Physician: Zachary bennett  Performed By: Nadine Acosta     BSA: 1.6 m2  Height: 62 in  Weight: 125 lb  HR: 82  BP: 127/77 mmHg  _____________________________________________________________________________  __        Procedure  Complete Portable Echo Adult.  _____________________________________________________________________________  __        Interpretation Summary     1. Normal biventricular size and function. Left ventricular ejection fraction  of 60-65 %. No segmental wall motion abnormalities noted.  2. The right ventricle is moderately dilated. Mildly decreased right  ventricular systolic function.  3. The left atrium is mildly dilated. The right atrium is severely dilated.  4. There is severe tricuspid regurgitation due to poor coaptation of the  Tv  leaflets.  5. Right ventricular systolic pressure is elevated, consistent with severe  pulmonary hypertension (>55 mmHg).  No prior study.  _____________________________________________________________________________  __        Left Ventricle  The left ventricle is normal in size. There is normal left ventricular wall  thickness. Left ventricular systolic function is normal. The visual ejection  fraction is estimated at 60-65%. Grade I or early diastolic dysfunction. No  regional wall motion abnormalities noted.     Right Ventricle  The right ventricle is moderately dilated. Mildly decreased right ventricular  systolic function.     Atria  The left atrium is mildly dilated. The right atrium is severely dilated. There  is no color Doppler evidence of an atrial shunt.     Mitral Valve  The mitral valve leaflets appear normal. There is no evidence of stenosis,  fluttering, or prolapse. There is trace mitral regurgitation.        Tricuspid Valve  Normal tricuspid valve. There is severe tricuspid regurgitation. Systolic flow  reversal in the hepatic veins. The right ventricular systolic pressure is  approximated at 49.8 mmHg plus the right atrial pressure. Right ventricular  systolic pressure is elevated, consistent with severe pulmonary hypertension.     Aortic Valve  The aortic valve is trileaflet. There is trace aortic regurgitation. No  hemodynamically significant valvular aortic stenosis.     Pulmonic Valve  The pulmonic valve is not well visualized. There is trace pulmonic valvular  regurgitation.     Vessels  The aortic root is normal size. Normal size ascending aorta. IVC diameter >2.1  cm collapsing <50% with sniff suggests a high RA pressure estimated at 15 mmHg  or greater.     Pericardium  There is no pericardial effusion.        Rhythm  Sinus rhythm was noted.  _____________________________________________________________________________  __  MMode/2D Measurements & Calculations  IVSd: 1.0 cm     LVIDd:  4.1 cm  LVIDs: 1.9 cm  LVPWd: 1.0 cm  FS: 53.2 %  LV mass(C)d: 133.5 grams  LV mass(C)dI: 85.3 grams/m2  Ao root diam: 2.9 cm  LA dimension: 2.9 cm  asc Aorta Diam: 3.1 cm  LA/Ao: 1.0  LA Volume (BP): 29.9 ml  LA Volume Index (BP): 19.0 ml/m2  RWT: 0.49           Doppler Measurements & Calculations  MV E max karen: 54.8 cm/sec  MV A max karen: 68.6 cm/sec  MV E/A: 0.80  MV dec time: 0.30 sec  PA acc time: 0.09 sec  TR max karen: 352.9 cm/sec  TR max P.8 mmHg  E/E' av.2  Lateral E/e': 5.4  Medial E/e': 8.9           _____________________________________________________________________________  __           Report approved by: Henry Cruz 2020 04:01 PM            Discharge Medications   Discharge Medication List as of 2020  1:18 PM      START taking these medications    Details   !! rivaroxaban ANTICOAGULANT (XARELTO) 15 MG TABS tablet Take 1 tablet (15 mg) by mouth 2 times daily (with meals) for 20 days, Disp-40 tablet,R-0, E-Prescribe      !! rivaroxaban ANTICOAGULANT (XARELTO) 20 MG TABS tablet Take 15 mg twice daily with food for 20 more days, then switch to 20 mg once daily with food, Disp-60 tablet,R-1, E-PrescribeFuture refills by primary care physician or cardiologist       !! - Potential duplicate medications found. Please discuss with provider.      CONTINUE these medications which have NOT CHANGED    Details   amLODIPine (NORVASC) 5 MG tablet Take 5 mg by mouth 2 times daily, Historical      atorvastatin (LIPITOR) 10 MG tablet Take 10 mg by mouth daily, Historical      butalbital-APAP-caffeine-codeine (FIORICET WITH CODEINE) -50-30 MG per capsule Take 1 capsule by mouth 2 times daily as needed for migraine (chronic pain), Historical      calcium carbonate 600 mg-vitamin D 400 units (CALTRATE) 600-400 MG-UNIT per tablet Take 1 tablet by mouth 2 times daily, Historical      carvedilol (COREG) 6.25 MG tablet Take 6.25 mg by mouth 2 times daily (with meals), Historical      fish  oil-omega-3 fatty acids 1000 MG capsule Take 1 g by mouth daily , Historical      meclizine (ANTIVERT) 25 MG tablet Take 25 mg by mouth 2 times daily as needed for dizziness (vertigo), Historical      omeprazole (PRILOSEC) 20 MG DR capsule Take 20 mg by mouth daily before breakfast, Historical      ondansetron (ZOFRAN) 4 MG tablet Take 4 mg by mouth every 8 hours as needed (vertigo), Historical      oxybutynin ER (DITROPAN-XL) 10 MG 24 hr tablet Take 10 mg by mouth daily, Historical           Allergies   Allergies   Allergen Reactions     Sulfa Drugs

## 2020-08-14 NOTE — PLAN OF CARE
A&Ox4.  VSS, RA.  Denied pain throughout the night.  PIV SL.  R groin site covered, CDI.  TELE: NSR.  Discharge pending, probably today.

## 2020-08-14 NOTE — PROGRESS NOTES
"Interventional Radiology Progress Note:  Inpatient at [facility]  Date: 2020   Patient name: Asher Sandra  MRN:[id number]  :  1940    History: Pleasant 80 year old female with bilateral pulmonary emboli with right heart strain and right LE DVT admitted via ER on 8/10/20. Patient underwent bilateral pulmonary artery mechanical thrombectomy and IVC filter placement with Dr Rea on 20.    Interval History: Doing well, anticipates discharge home today. Denies CP or SOB. No concerns.    Physical Exam:   Vitals: /67 (BP Location: Right arm)   Pulse 59   Temp 98.3  F (36.8  C) (Oral)   Resp 16   Ht 1.575 m (5' 2\")   Wt 56.9 kg (125 lb 8 oz)   SpO2 97%   BMI 22.95 kg/m     General: Stable. In no acute distress. A&O x 3. Moves all extremities equally. Bilateral lower extremity pulses palpable with intact color, warmth, motor and sensation. No gross motor weakness. Right groin site dressing removed, site is C/D/I with erythema, bleeding or hematoma. Site was closed with Perclose, Angioseal and Monocryl; no additional dressing in needed.    Labs:  Recent Labs   Lab 20  0558 20  0708 20  0436   HGB 9.9* 11.5* 11.8   WBC 9.6 8.1 9.6     Assessment: s/p pulmonary angiogram, mechanical thrombectomy and IVC filter placement on 20    Plan: Small dressing can be used over right groin site as needed if sensitive on panty line. Anticoagulation per vascular medicine. IR will contact patient to follow up with Dr Rea in clinic in approx 1 month.    10 minutes were spent with patient during today's visit with greater than 50% of the time spent face to face with the patient, in reviewing medical record and images and in counseling and coordinating patient's care.    Amie Parekh PA-C  Interventional Radiology    "

## 2020-08-14 NOTE — PROVIDER NOTIFICATION
"MD Notification    Notified Person: MD    Notified Person Name: Tabby    Notification Date/Time: 08/14/20 8:02 AM    Notification Interaction: online paging    Purpose of Notification: \"Critical heparin 10A greater than 2 however pt on xarelto\"    Orders Received:    Comments:  "

## 2020-08-14 NOTE — PLAN OF CARE
A&Ox4. VSS on RA. IV removed. Right groin site open to air, CDI. CMS intact. Lungs clear, denies SOB. Bowel sounds active. Denies pain.    AVS printed and reviewed with patient and . Questions were encouraged and answered. Both verbalized understanding. Medication education provided. All medications and belongings were sent home with patient. Patient discharged off unit in stable condition by staff to home.

## 2020-08-17 DIAGNOSIS — I26.99 PULMONARY EMBOLISM (H): Primary | ICD-10-CM

## 2020-08-26 ENCOUNTER — OFFICE VISIT (OUTPATIENT)
Dept: OTHER | Facility: CLINIC | Age: 80
End: 2020-08-26
Attending: PHYSICIAN ASSISTANT
Payer: MEDICARE

## 2020-08-26 VITALS
HEIGHT: 62 IN | WEIGHT: 124 LBS | SYSTOLIC BLOOD PRESSURE: 110 MMHG | DIASTOLIC BLOOD PRESSURE: 57 MMHG | RESPIRATION RATE: 14 BRPM | BODY MASS INDEX: 22.82 KG/M2 | HEART RATE: 59 BPM | OXYGEN SATURATION: 98 %

## 2020-08-26 DIAGNOSIS — I26.09 ACUTE PULMONARY EMBOLISM WITH ACUTE COR PULMONALE, UNSPECIFIED PULMONARY EMBOLISM TYPE (H): Primary | ICD-10-CM

## 2020-08-26 PROCEDURE — G0463 HOSPITAL OUTPT CLINIC VISIT: HCPCS

## 2020-08-26 PROCEDURE — 99214 OFFICE O/P EST MOD 30 MIN: CPT | Mod: ZP | Performed by: PHYSICIAN ASSISTANT

## 2020-08-26 SDOH — HEALTH STABILITY: MENTAL HEALTH: HOW OFTEN DO YOU HAVE A DRINK CONTAINING ALCOHOL?: 4 OR MORE TIMES A WEEK

## 2020-08-26 ASSESSMENT — MIFFLIN-ST. JEOR: SCORE: 990.71

## 2020-08-26 NOTE — PROGRESS NOTES
"Asher Sandra is a 80 year old female who presents for:  Chief Complaint   Patient presents with     RECHECK     f/u to hospital PE        Vitals:    Vitals:    08/26/20 1321 08/26/20 1322   BP: 118/75 110/57   BP Location: Right arm Left arm   Patient Position: Chair Chair   Cuff Size: Adult Regular Adult Regular   Pulse: 59    Resp: 14    SpO2: 98%    Weight: 124 lb (56.2 kg)    Height: 5' 2\" (1.575 m)        BMI:  Estimated body mass index is 22.68 kg/m  as calculated from the following:    Height as of this encounter: 5' 2\" (1.575 m).    Weight as of this encounter: 124 lb (56.2 kg).    Pain Score:  Data Unavailable        Dave Wallace CMA    "

## 2020-08-26 NOTE — PATIENT INSTRUCTIONS
1. Continue Xarelto.     2. Elevate legs and wear compression stockings.     3. Get your ultrasound and follow up with Dr. Rea of Interventional Radiology in September.     4. Follow up with Dr. Bruce in about 1 month to determine timing of IVC filter removal.     5. In about 3 months you will need an echocardiogram repeated.

## 2020-08-26 NOTE — PROGRESS NOTES
[facility]  VASCULAR MEDICINE     FOLLOW-UP VISIT      PRIMARY HEALTH CARE PROVIDER:  Zachary Tee    REASON FOR VISIT:  Follow up PE/DVT      HPI: Asher Sandra is a 80 year old  female with history of superficial thrombophlebitis in the remote past back in the mid-1960s and 1970s when she had 2 pregnancies, but who states she has never had a deep venous thrombosis in her life and no significant family history of deep venous thrombosis who presented to [facility] with a 1-week history of shortness of breath, chest pain and a 3-day history of right leg pain with workup being positive for bilateral PE with right heart strain, elevated BNP and troponin and right lower extremity DVT in the right popliteal and calf veins. D-dimer was elevated at 3.8.  She was given intravenous heparin and admitted. COVID returned negative. She was taken to  on 8/12/20 for pulmonary thrombectomy and IVC filter placement. She was eventually discharged to home on Xarelto. She has been doing well. She occasionally still has shortness of breath with exertion, but this is much improved from the time of her hospitalization. She is using old compression stockings for her legs that she got at [facility] and has yet to get new ones. She continues to have mild bipedal edema, but she has had this most of her life. She has been active and is actually moving out of her condo with her  into an Extended Stay until the ultimately move to [place] in a few months to be closer to family.     She has had superficial venous thrombosis in the past, with the first episode being in 1967 after childbirth. She has no personal or family history of DVT or known clotting disorders. She has not traveled recently or undergone any surgical procedures. She has been rather active with trying to get her condo ready to move out of. She states she is up to date on mammograms and colonoscopies. No  thrombophilia evaluation was warranted.    PAST MEDICAL HISTORY  1.  History of chronic venous stasis disease.   2.  Fecal incontinence.   3.  GERD.   4.  BPPV.   5.  History of colonic adenoma.     6.  History of uterine prolapse.   7.  Overactive bladder.   8.  Hypercholesterolemia.   9.  Sleep apnea.   10.  Migraines.   11.  Osteoarthritis.   12.  Hypertension.   13.  Osteopenia.   14.  Granulomatous lung disease.   15.  Midline cystocele.   16.  Hyperlipidemia.   17.  Diverticulitis.   18.  DJD.         CURRENT MEDICATIONS  amLODIPine (NORVASC) 5 MG tablet, Take 5 mg by mouth 2 times daily  atorvastatin (LIPITOR) 10 MG tablet, Take 10 mg by mouth daily  butalbital-APAP-caffeine-codeine (FIORICET WITH CODEINE) -11-30 MG per capsule, Take 1 capsule by mouth 2 times daily as needed for migraine (chronic pain)  calcium carbonate 600 mg-vitamin D 400 units (CALTRATE) 600-400 MG-UNIT per tablet, Take 1 tablet by mouth 2 times daily  carvedilol (COREG) 6.25 MG tablet, Take 6.25 mg by mouth 2 times daily (with meals)  fish oil-omega-3 fatty acids 1000 MG capsule, Take 1 g by mouth daily   meclizine (ANTIVERT) 25 MG tablet, Take 25 mg by mouth 2 times daily as needed for dizziness (vertigo)  omeprazole (PRILOSEC) 20 MG DR capsule, Take 20 mg by mouth daily before breakfast  ondansetron (ZOFRAN) 4 MG tablet, Take 4 mg by mouth every 8 hours as needed (vertigo)  oxybutynin ER (DITROPAN-XL) 10 MG 24 hr tablet, Take 10 mg by mouth daily  rivaroxaban ANTICOAGULANT (XARELTO) 15 MG TABS tablet, Take 1 tablet (15 mg) by mouth 2 times daily (with meals) for 20 days  [START ON 9/3/2020] rivaroxaban ANTICOAGULANT (XARELTO) 20 MG TABS tablet, Take 15 mg twice daily with food for 20 more days, then switch to 20 mg once daily with food    No current facility-administered medications on file prior to visit.       PAST SURGICAL HISTORY:  Past Surgical History:   Procedure Laterality Date     IR IVC FILTER PLACEMENT  8/12/2020      IR PULMONARY ANGIOGRAM BILATERAL  8/12/2020   Tonsillectomy, adenoidectomy, colonoscopy, cataract surgery.    ALLERGIES     Allergies   Allergen Reactions     Sulfa Drugs        FAMILY HISTORY  1.  Mother with osteoporosis, macular degeneration, migraines and Alzheimer's.     2.  Father with skin cancer, heart disease, hyperlipidemia, hypertension, stroke and osteoporosis   3.  Sister with hyperlipidemia, migraines and glaucoma.     SOCIAL HISTORY  Social History     Socioeconomic History     Marital status:      Spouse name: Not on file     Number of children: Not on file     Years of education: Not on file     Highest education level: Not on file   Occupational History     Not on file   Social Needs     Financial resource strain: Not on file     Food insecurity     Worry: Not on file     Inability: Not on file     Transportation needs     Medical: Not on file     Non-medical: Not on file   Tobacco Use     Smoking status: Never Smoker     Smokeless tobacco: Never Used   Substance and Sexual Activity     Alcohol use: Yes     Frequency: 4 or more times a week     Drug use: Never     Sexual activity: Not on file   Lifestyle     Physical activity     Days per week: Not on file     Minutes per session: Not on file     Stress: Not on file   Relationships     Social connections     Talks on phone: Not on file     Gets together: Not on file     Attends Judaism service: Not on file     Active member of club or organization: Not on file     Attends meetings of clubs or organizations: Not on file     Relationship status: Not on file     Intimate partner violence     Fear of current or ex partner: Not on file     Emotionally abused: Not on file     Physically abused: Not on file     Forced sexual activity: Not on file   Other Topics Concern     Not on file   Social History Narrative     Not on file       ROS:   General: No change in weight, sleep or appetite.  Normal energy.  No fever or chills  Eyes: Negative for  "vision changes or eye problems  ENT: No problems with ears, nose or throat.  No difficulty swallowing.  Resp: No coughing, wheezing. Occasional shortness of breath with activity since PE.   CV: No chest pains or palpitations  GI: No nausea, vomiting,  heartburn, abdominal pain, diarrhea, constipation or change in bowel habits  : No urinary frequency or dysuria, bladder or kidney problems  Musculoskeletal: No significant muscle or joint pains  Neurologic: No headaches, numbness, tingling, weakness, problems with balance or coordination  Psychiatric: No problems with anxiety, depression or mental health  Heme/immune/allergy: No history of bleeding or clotting problems or anemia.  No allergies or immune system problems  Endocrine: No history of thyroid disease, diabetes or other endocrine disorders  Skin: No rashes,worrisome lesions or skin problems  Vascular:  No claudication, lifestyle limiting or otherwise; no ischemic rest pain; no non-healing ulcers. No weakness, No loss of sensation      EXAM:  /57 (BP Location: Left arm, Patient Position: Chair, Cuff Size: Adult Regular)   Pulse 59   Resp 14   Ht 1.575 m (5' 2\")   Wt 56.2 kg (124 lb)   SpO2 98%   Breastfeeding No   BMI 22.68 kg/m    In general, the patient is a pleasant female in no apparent distress.    HEENT: NC/AT.  PERRLA.  EOMI.  Sclerae white, not injected.  Nares clear.  Pharynx without erythema or exudate.  Dentition intact.    Neck: No adenopathy.   Heart: RRR. Normal S1, S2 splits physiologically. No murmur, rub, click, or gallop.   Lungs: CTA.  No ronchi, wheezes, rales.    Abdomen: Soft, nontender, nondistended.   Extremities: No clubbing, cyanosis. Trace bilateral edema.  No wounds. Compression stockings on.       Labs:  LIPID RESULTS:  Lab Results   Component Value Date    CHOL 240 (H) 10/25/2002    HDL 48 10/25/2002     (H) 10/25/2002    TRIG 98 10/25/2002    CHOLHDLRATIO 5 10/25/2002       LIVER ENZYME RESULTS:  Lab Results "   Component Value Date    AST 27 10/25/2002    ALT 27 10/25/2002       CBC RESULTS:  Lab Results   Component Value Date    WBC 9.6 08/13/2020    RBC 2.90 (L) 08/13/2020    HGB 9.8 (L) 08/14/2020    HCT 28.9 (L) 08/13/2020     08/13/2020    MCH 34.1 (H) 08/13/2020    MCHC 34.3 08/13/2020    RDW 12.3 08/13/2020     08/13/2020       BMP RESULTS:  Lab Results   Component Value Date     08/13/2020    POTASSIUM 3.8 08/13/2020    CHLORIDE 108 08/13/2020    CO2 24 08/13/2020    ANIONGAP 5 08/13/2020    GLC 98 08/13/2020    BUN 16 08/13/2020    CR 0.69 08/13/2020    GFRESTIMATED 83 08/13/2020    GFRESTBLACK >90 08/13/2020    NEAL 7.6 (L) 08/13/2020          Procedures:   CT chest 8/10/20    IMPRESSION:  1.  Multiple occlusive and nonocclusive pulmonary emboli are noted  bilaterally, with slightly greater clot burden on the right.  2.  Prominence of the right heart suggests some degree of associated  right heart strain.  3.  Trace left pleural effusion.      Venous US lower extremities 8/10/20    IMPRESSION:  1.  Deep venous thrombosis is present within the right lower extremity from popliteal to calf veins.      Echocardiogram 8/11/20    Interpretation Summary     1. Normal biventricular size and function. Left ventricular ejection fraction  of 60-65 %. No segmental wall motion abnormalities noted.  2. The right ventricle is moderately dilated. Mildly decreased right  ventricular systolic function.  3. The left atrium is mildly dilated. The right atrium is severely dilated.  4. There is severe tricuspid regurgitation due to poor coaptation of the Tv  leaflets.  5. Right ventricular systolic pressure is elevated, consistent with severe  pulmonary hypertension (>55 mmHg).  No prior study.      Assessment and Plan:   Bilateral pulmonary emboli with right heart strain and right lower extremity DVT in patient with multiple prior superficial venous thromboses now s/p pulmonary thrombectomy and IVC filter  placement 8/12/20     Assessment:   -She presented with an 8 day history of shortness of breath on exertion, chest pain and right lower extremity discomfort. Imaging did show bilateral pulmonary emboli with evidence for right heart strain.   -BNP and troponin were elevated.   -Echocardiogram with evidence of some right heart strain.   -Ultrasound of the lower extremities was significant for DVT in the right lower extremity from the popliteal vein to calf veins.   -COVID negative.   -Hx of SVT in past with pregnancies. No prior personal or family hx of DVT/PE. Up to date on cancer screenings. No thrombophilia eval warranted.   -Tolerating Xarelto  -SOB improved. No chest pain. Minimal lower extremity edema.      Plan:   -Continue Xarelto 15 mg BID for 21 days total followed by 20 mg daily thereafter.   -Scheduled for IVC and right lower extremity venous duplex 9/9/20, followed by IR visit on 9/14/20 and then visit with Dr. Bruce of [facility] on 9/21/10 to assess timing of IVC filter removal.  -Repeat echo in about 3 months.  -She and her  had plans to move to [place] at the end of the month. They have decided to postpone this move until everything regarding care of this PE is completed.   -Elevate right leg, utilize compression stockings during waking hours (20-30 mmHg thigh high) - prescription was given to her during hospitalization.        Nia Hanson PA-C

## 2020-09-09 ENCOUNTER — HOSPITAL ENCOUNTER (OUTPATIENT)
Dept: ULTRASOUND IMAGING | Facility: CLINIC | Age: 80
End: 2020-09-09
Attending: RADIOLOGY
Payer: MEDICARE

## 2020-09-09 ENCOUNTER — HOSPITAL ENCOUNTER (OUTPATIENT)
Dept: ULTRASOUND IMAGING | Facility: CLINIC | Age: 80
End: 2020-09-09
Attending: PHYSICIAN ASSISTANT
Payer: MEDICARE

## 2020-09-09 DIAGNOSIS — I26.99 PULMONARY EMBOLISM (H): ICD-10-CM

## 2020-09-09 DIAGNOSIS — I26.09 ACUTE PULMONARY EMBOLISM WITH ACUTE COR PULMONALE, UNSPECIFIED PULMONARY EMBOLISM TYPE (H): ICD-10-CM

## 2020-09-09 PROCEDURE — 93971 EXTREMITY STUDY: CPT | Mod: RT

## 2020-09-09 PROCEDURE — 93978 VASCULAR STUDY: CPT

## 2020-09-14 ENCOUNTER — OFFICE VISIT (OUTPATIENT)
Dept: OTHER | Facility: CLINIC | Age: 80
End: 2020-09-14
Attending: RADIOLOGY
Payer: MEDICARE

## 2020-09-14 VITALS — SYSTOLIC BLOOD PRESSURE: 107 MMHG | HEART RATE: 58 BPM | DIASTOLIC BLOOD PRESSURE: 66 MMHG

## 2020-09-14 DIAGNOSIS — I26.09 ACUTE PULMONARY EMBOLISM WITH ACUTE COR PULMONALE, UNSPECIFIED PULMONARY EMBOLISM TYPE (H): Primary | ICD-10-CM

## 2020-09-15 NOTE — PROGRESS NOTES
Patient Name: Asher Sandra  Patient MRN: [id number]  Patient : 1940    HPI: This is a 80 year old female who presented to [facility] emergency room on 8/10/2020 right lower leg pain along with shortness of breath.  The patient had a past history of DVT with superficial venous thromboses. Ultrasound of the lower extremities was significant for DVT in the right lower extremity from the popliteal vein to calf veins.  Imaging also showed bilateral pulmonary emboli with evidence for right heart strain.      The patient was brought to interventional radiology for a pulmonary angiogram and placement of inferior vena cava filter.  Mechanical thrombectomy was performed with removal of significant amount of pulmonary embolus.  Pulmonary pressures: pre-treatment 41/8 (20)mmHg  Post treatment 23/5 (11)mm Hg.        Today, patient presents to clinic accompanied by her .  She is doing well clinically.  She denies shortness of breath or chest pain.  She is using old compression stockings for her legs.  She continues to have mild pedal edema which is chronic for her.  She is planning to move to [place] in a few months to be closer to family.   She is Xarelto and is tolerating it well.         OBJECTIVE:   /66 (BP Location: Left arm, Patient Position: Sitting, Cuff Size: Adult Regular)   Pulse 58   Breastfeeding No     General Appearance: WDWN female in NAD  Lower Extremity: mild pedal edema ( chronic)  Distal pulses are intact.    Current Outpatient Medications   Medication     amLODIPine (NORVASC) 5 MG tablet     atorvastatin (LIPITOR) 10 MG tablet     butalbital-APAP-caffeine-codeine (FIORICET WITH CODEINE) -18-30 MG per capsule     calcium carbonate 600 mg-vitamin D 400 units (CALTRATE) 600-400 MG-UNIT per tablet     carvedilol (COREG) 6.25 MG tablet     fish oil-omega-3 fatty acids 1000 MG capsule     meclizine (ANTIVERT) 25 MG tablet     omeprazole (PRILOSEC) 20 MG   capsule     ondansetron (ZOFRAN) 4 MG tablet     oxybutynin ER (DITROPAN-XL) 10 MG 24 hr tablet     rivaroxaban ANTICOAGULANT (XARELTO) 20 MG TABS tablet     rivaroxaban ANTICOAGULANT (XARELTO) 15 MG TABS tablet     No current facility-administered medications for this visit.      Patient Active Problem List   Diagnosis     SOB (shortness of breath)         Imaging:   Results for orders placed or performed during the hospital encounter of 09/09/20   US Lower Extremity Venous Duplex Right    Narrative    ULTRASOUND AORTA/IVC/ILIAC DUPLEX COMPLETE;   ULTRASOUND RIGHT LOWER EXTREMITY VENOUS DUPLEX  9/9/2020 10:57 AM    CLINICAL HISTORY/INDICATION: IVC filter. Evaluate for timing of  removal. Acute pulmonary embolism with acute cor pulmonale,  unspecified pulmonary embolism type (H).    COMPARISON: Ultrasound 8/20/2020    TECHNIQUE:   Grayscale, color-flow, and spectral waveform analysis were performed  of the deep veins of the right lower extremity    FINDINGS: The inferior vena cava is patent without evidence of  thrombus. The right external iliac vein is patent. Left iliac vein is  not visualized. Bilateral external iliac veins and common femoral  veins are patent.    The right femoral vein, popliteal vein, posterior tibial and peroneal  veins demonstrate normal compressibility, spectral waveform, color  flow and augmentation.      Impression    IMPRESSION:   1. No acute DVT in the right lower extremity.  2. Resolution of previously seen deep vein thrombosis in the right  popliteal, posterior tibial and peroneal veins.  3. The IVC and bilateral iliac veins where visualized are patent.    TUCKER TORRES,          Assessment/Plan:  This is a pleasant 80 year old female who is recovering well following mechanical thrombectomy of pulmonary emboli with IVC filter placement.     We discussed her imaging that was performed today.  No acute DVT in the right lower extremity with resolution of previously seen deep vein  thrombosis in the popliteal, posterior tibial and peroneal veins.    Recommend: continue with schedule appointment with Dr. Bruce to determine timing of filter removal.  Once cleared we will arrange for IVC filter removal.     We no longer need to see patient unless concerns.    I met with the patient for 30 minutes of which >50% of the time was used to discuss treatment options and/or coordination of care.    Dr. Tana Rea DO  Pager: [phone]  Interventional Radiology   [facility]  [phone]

## 2020-09-21 ENCOUNTER — OFFICE VISIT (OUTPATIENT)
Dept: OTHER | Facility: CLINIC | Age: 80
End: 2020-09-21
Attending: INTERNAL MEDICINE
Payer: MEDICARE

## 2020-09-21 ENCOUNTER — TELEPHONE (OUTPATIENT)
Dept: OTHER | Facility: CLINIC | Age: 80
End: 2020-09-21

## 2020-09-21 VITALS
WEIGHT: 125 LBS | SYSTOLIC BLOOD PRESSURE: 126 MMHG | BODY MASS INDEX: 22.86 KG/M2 | HEART RATE: 61 BPM | DIASTOLIC BLOOD PRESSURE: 74 MMHG

## 2020-09-21 DIAGNOSIS — I82.409 DVT (DEEP VENOUS THROMBOSIS) (H): Primary | ICD-10-CM

## 2020-09-21 DIAGNOSIS — I26.09 ACUTE PULMONARY EMBOLISM WITH ACUTE COR PULMONALE, UNSPECIFIED PULMONARY EMBOLISM TYPE (H): Primary | ICD-10-CM

## 2020-09-21 PROCEDURE — G0463 HOSPITAL OUTPT CLINIC VISIT: HCPCS

## 2020-09-21 PROCEDURE — 99214 OFFICE O/P EST MOD 30 MIN: CPT | Mod: ZP | Performed by: INTERNAL MEDICINE

## 2020-09-21 NOTE — PROGRESS NOTES
Asher Sandra is a 80 year old female who is presenting at the current time to discuss her diagnosi(es) of     Acute pulmonary embolism with acute cor pulmonale, unspecified pulmonary embolism type (H) .         HPI: Asher Sandra is a 80 year old female who presented 8/10/2020 with an 8 day history of shortness of breath on exertion, chest pain and right lower extremity discomfort. Imaging did show bilateral pulmonary emboli with evidence for right heart strain. BNP and troponin were also elevated. An echocardiogram revealed right heart strain. Ultrasound of the lower extremities was significant for DVT in the right lower extremity from the popliteal vein to calf veins. She was started on IV heparin and admitted to the hospital.      On 8/12/2020 she went tto Interventional Radiology for IVC filter placement and pulmonary artery mechanical thrombectomy.        She was discharged on 8/14/2020 with a prescription for Xarelto as well as Sigvaris 20-30 mmHg thigh high compression stockings, which she is  wearing 20 plus hours per day.      She has had superficial venous thrombosis in the past, with the first episode being in 1967 after childbirth. She has no personal or family history of DVT or known clotting disorders. She has not traveled recently or undergone any surgical procedures. She has been rather active with trying to get her condo ready to move out of. She states she is up to date on mammograms and colonoscopies. No thrombophilia evaluation was therefore warranted.    Since discharge , she has been doing well. F/U imaging as below reveals no IVC or intrafilter thrombus.    Review Of Systems  Skin: negative  Eyes: negative  Ears/Nose/Throat: negative  Respiratory: No shortness of breath, dyspnea on exertion, cough, or hemoptysis  Cardiovascular: negative  Gastrointestinal: negative  Genitourinary: negative  Musculoskeletal: negative  Neurologic: negative  Psychiatric:  negative  Hematologic/Lymphatic/Immunologic: negative  Endocrine: negative       PAST MEDICAL HISTORY:                No past medical history on file.    PAST SURGICAL HISTORY:                  Past Surgical History:   Procedure Laterality Date     IR IVC FILTER PLACEMENT  8/12/2020     IR PULMONARY ANGIOGRAM BILATERAL  8/12/2020       CURRENT MEDICATIONS:                  Current Outpatient Medications   Medication Sig Dispense Refill     amLODIPine (NORVASC) 5 MG tablet Take 5 mg by mouth 2 times daily       atorvastatin (LIPITOR) 10 MG tablet Take 10 mg by mouth daily       butalbital-APAP-caffeine-codeine (FIORICET WITH CODEINE) -89-30 MG per capsule Take 1 capsule by mouth 2 times daily as needed for migraine (chronic pain)       calcium carbonate 600 mg-vitamin D 400 units (CALTRATE) 600-400 MG-UNIT per tablet Take 1 tablet by mouth 2 times daily       carvedilol (COREG) 6.25 MG tablet Take 6.25 mg by mouth 2 times daily (with meals)       fish oil-omega-3 fatty acids 1000 MG capsule Take 1 g by mouth daily        meclizine (ANTIVERT) 25 MG tablet Take 25 mg by mouth 2 times daily as needed for dizziness (vertigo)       omeprazole (PRILOSEC) 20 MG DR capsule Take 20 mg by mouth daily before breakfast       ondansetron (ZOFRAN) 4 MG tablet Take 4 mg by mouth every 8 hours as needed (vertigo)       oxybutynin ER (DITROPAN-XL) 10 MG 24 hr tablet Take 10 mg by mouth daily       rivaroxaban ANTICOAGULANT (XARELTO) 20 MG TABS tablet Take 15 mg twice daily with food for 20 more days, then switch to 20 mg once daily with food 60 tablet 1     rivaroxaban ANTICOAGULANT (XARELTO) 15 MG TABS tablet Take 1 tablet (15 mg) by mouth 2 times daily (with meals) for 20 days 40 tablet 0       ALLERGIES:                  Allergies   Allergen Reactions     Sulfa Drugs        SOCIAL HISTORY:                  Social History     Socioeconomic History     Marital status:      Spouse name: Not on file     Number of  children: Not on file     Years of education: Not on file     Highest education level: Not on file   Occupational History     Not on file   Social Needs     Financial resource strain: Not on file     Food insecurity     Worry: Not on file     Inability: Not on file     Transportation needs     Medical: Not on file     Non-medical: Not on file   Tobacco Use     Smoking status: Never Smoker     Smokeless tobacco: Never Used   Substance and Sexual Activity     Alcohol use: Yes     Frequency: 4 or more times a week     Drug use: Never     Sexual activity: Not on file   Lifestyle     Physical activity     Days per week: Not on file     Minutes per session: Not on file     Stress: Not on file   Relationships     Social connections     Talks on phone: Not on file     Gets together: Not on file     Attends Voodoo service: Not on file     Active member of club or organization: Not on file     Attends meetings of clubs or organizations: Not on file     Relationship status: Not on file     Intimate partner violence     Fear of current or ex partner: Not on file     Emotionally abused: Not on file     Physically abused: Not on file     Forced sexual activity: Not on file   Other Topics Concern     Not on file   Social History Narrative     Not on file       FAMILY HISTORY:                 No family history on file.      Physical exam Reveals:    O/P: WNL  HEENT: WNL  NECK: No JVD, thyromegaly, or lymphadenopathy  HEART: RRR, no murmurs, gallops, or rubs  LUNGS: CTA bilaterally without rales, wheezes, or rhonchi  GI: NABS, nondistended, nontender, soft  EXT:without cyanosis, clubbing, or edema; CEAP C4 varicosities bialterally  NEURO: nonfocal  : no flank tenderness            ULTRASOUND AORTA/IVC/ILIAC DUPLEX COMPLETE;   ULTRASOUND RIGHT LOWER EXTREMITY VENOUS DUPLEX  9/9/2020 10:57 AM     CLINICAL HISTORY/INDICATION: IVC filter. Evaluate for timing of  removal. Acute pulmonary embolism with acute cor  pulmonale,  unspecified pulmonary embolism type (H).     COMPARISON: Ultrasound 8/20/2020     TECHNIQUE:   Grayscale, color-flow, and spectral waveform analysis were performed  of the deep veins of the right lower extremity     FINDINGS: The inferior vena cava is patent without evidence of  thrombus. The right external iliac vein is patent. Left iliac vein is  not visualized. Bilateral external iliac veins and common femoral  veins are patent.     The right femoral vein, popliteal vein, posterior tibial and peroneal  veins demonstrate normal compressibility, spectral waveform, color  flow and augmentation.                                                                      IMPRESSION:   1. No acute DVT in the right lower extremity.  2. Resolution of previously seen deep vein thrombosis in the right  popliteal, posterior tibial and peroneal veins.  3. The IVC and bilateral iliac veins where visualized are patent.     TUCKER TORRES DO      INTERVENTIONAL RADIOLOGY PULMONARY ANGIOGRAM BILATERAL, INTERVENTIONAL  RADIOLOGY INFERIOR VENA CAVA FILTER PLACEMENT   8/12/2020 4:20 PM      CLINICAL HISTORY/INDICATION: Pulmonary embolism, right heart strain.     PROCEDURES PERFORMED:   1. Ultrasound-guided right common femoral venotomy.  2. Right and left main pulmonary angiography with pressures.  3. Bilateral pulmonary artery mechanical thrombectomy with post  thrombectomy angiography.  4. Placement of an ALN temporary IVC filter.     MEDICATIONS: 1% lidocaine, 4 mg Versed IV, 200 mcg Fentanyl IV and  8000 units heparin IV.     CONTRAST: 111 mL Visipaque     FLUORO: 22.9 minutes     AIR KERMA: 59.84 mGy     CONSENT: Following a discussion of the risks, benefits, indications  and alternatives to treatment, appropriate informed consent was  obtained.       TIMEOUT: A timeout was performed per universal protocol policy to  ensure correct patient, site, and procedure to be performed.      PROCEDURE AND FINDINGS:  Following a  discussion of the risks, benefits, indications, and  alternatives to treatment, appropriate informed consent was obtained  from the patient. The patient was brought to the interventional  radiology suite and placed supine on the table. Bilateral groins were  prepped and draped in a sterile fashion.  A timeout was performed per  universal protocol policy to confirm the correct patient, site and  procedure to be performed.     A preliminary ultrasound of the right groin was performed and  demonstrates a patent right common femoral vein. A permanent  ultrasound image was recorded.  Using a combination of fluoroscopy and  ultrasound, an access site was determined.  The overlying skin was  anesthetized with 1% Lidocaine.  Using ultrasound guidance,  access  into the right common femoral vein was obtained with visualization of  needle entry into the vessel. A 0.018 wire was advanced through the  needle and exchange was made for a 5 Cymro micropuncture sheath. A  0.035 wire was advanced through the micropuncture sheath and exchange  was made for a 6 Cymro vascular sheath. Then 2 Perclose devices were  deployed in staggered fashion in the typical preclose technique.  A  total of 8000 Units Heparin IV was administered to maintain an ACT of  250-300. The tract was dilated and a 24 Cymro vascular sheath was  advanced into the inferior vena cava over the wire.     Using a combination of wire and angled pigtail catheter access was  gained into the main pulmonary artery. Using a combination of catheter  and wire access was gained into the left main pulmonary artery.  Digital subtraction angiography was performed, images show a moderate  to large amount of pulmonary embolus in the left lower lobe pulmonary  artery. The pigtail catheter was removed over the wire and exchanged  for the 24 Cymro FlowTriever.  Over the wire the medium disc was  advanced over the wire into the left lower lobe pulmonary artery and  deployed. The  discs were then pulled back into the catheter and  removed over the wire. The FlowTriever was then advanced into the left  lower lobe pulmonary artery and mechanical thrombectomy was performed  with removal of a significant amount of pulmonary embolus. Post  mechanical thrombectomy angiography was performed, images show  significant improvement in amount of pulmonary emboli.      Using a combination of catheter and wire access was gained into the  right main pulmonary artery. The FlowTriever was then advanced into  the right main pulmonary artery. Digital subtraction angiography was  performed, images show a large amount of thrombus in the right  pulmonary arteries. The FlowTriever was advanced to the level of the  pulmonary emboli and mechanical thrombectomy was performed with  removal of a significant amount of pulmonary embolus. Post aspiration  angiography was performed, images show significant improvement in the  amount of thrombus.      Pulmonary pressures:  Pre treatment: 41 / 8  (20) mmHg  Post Treatment: 23 / 5 (11) mmHg     IVC FILTER PLACEMENT  The FlowTriever catheter was removed. The vascular sheath was pulled  back into the right common iliac vein. The wire was pulled back  through the pulmonary artery into the IVC.     The pigtail flush catheter was then advanced into the inferior vena  cava at the iliac confluence. An inferior venacavogram was performed  which demonstrates conventional caval anatomy without duplication of  the IVC or the renal veins. The inferior vena cava is normal in  caliber and no thrombus or web is identified.     The flush catheter was removed over a wire and the sheath was  exchanged for the filter delivery sheath. The sheath was advanced to  the suprarenal inferior vena cava, the filter was advanced to the end  of the sheath, and the sheath and filter were retracted as a unit for  delivery in an infrarenal position. The filter was deployed.      The sheath was removed and the  previously placed Perclose sutures were  tied down in standard fashion. There was still a moderate amount of  bleeding, so an 8 Malaysian Angio-Seal was deployed with excellent  hemostasis. Manual compression was held for 10 minutes. The small  incision was closed with a 3-0 Monocryl suture. A sterile dressing was  applied.     Throughout the procedure, the patient was monitored by a radiology  nurse for cardiac rhythm and oxygen saturation which remained stable.  Total moderate sedation time was 116 min. The patient tolerated the  procedure well and left interventional radiology in stable condition.                                                                      IMPRESSION:   1. Successful bilateral pulmonary angiography and mechanical  thrombectomy, with significantly improved pulmonary pressures..  2. Placement of an ALN temporary IVC filter     TUCKER TORRES DO    A/P:    (I26.09) First lifetime VTE presenting as acute pulmonary embolism with acute cor pulmonale, arising form RLE DVT, S/P PA suction thrombectomy and IVC filter placement 8/12/2020.  (primary encounter diagnosis)  Comment: no IVC or leg thrombus  Plan: retrieve filter in next week; stay on Xarelto until April. Will drive back here from [place] for d dimer, RLE duplex, TTE all to be done on same day with f/u in person visit with me same day. Decision at that time regarding Xarelto cessation versus continuation but with dose reduction to 10 mg daily.

## 2020-09-21 NOTE — TELEPHONE ENCOUNTER
Per Dr. Bruce patient needs to be scheduled for IVC filter retrieval. Routing to [facility] IR nurse to coordinate.     Marla Chavez  BSN, RN    [facility]  Office: [phone]  Fax: [phone]

## 2020-09-21 NOTE — TELEPHONE ENCOUNTER
Contacted patient to schedule IVC filter removal. Left VM  Ailyn Gatica RN  IR nurse clinician  [phone]

## 2020-09-21 NOTE — NURSING NOTE
"Asher Sandra is a 80 year old female who presents for:  Chief Complaint   Patient presents with     RECHECK     IVC filter        Vitals:    Vitals:    09/21/20 1505   BP: 126/74   BP Location: Right arm   Patient Position: Sitting   Cuff Size: Adult Regular   Pulse: 61   Weight: 125 lb (56.7 kg)       BMI:  Estimated body mass index is 22.86 kg/m  as calculated from the following:    Height as of 8/26/20: 5' 2\" (1.575 m).    Weight as of this encounter: 125 lb (56.7 kg).    Pain Score:  Data Unavailable        Silvana Gary MA  ,    " Equal and normal pulses (carotid, femoral, dorsalis pedis)

## 2020-09-22 DIAGNOSIS — Z11.59 ENCOUNTER FOR SCREENING FOR OTHER VIRAL DISEASES: Primary | ICD-10-CM

## 2020-09-23 ENCOUNTER — TELEPHONE (OUTPATIENT)
Dept: OTHER | Facility: CLINIC | Age: 80
End: 2020-09-23

## 2020-09-23 DIAGNOSIS — I26.09 ACUTE PULMONARY EMBOLISM WITH ACUTE COR PULMONALE, UNSPECIFIED PULMONARY EMBOLISM TYPE (H): ICD-10-CM

## 2020-09-23 DIAGNOSIS — I82.409 DVT (DEEP VENOUS THROMBOSIS) (H): Primary | ICD-10-CM

## 2020-09-23 DIAGNOSIS — I26.99 PULMONARY EMBOLISM (H): ICD-10-CM

## 2020-09-23 NOTE — TELEPHONE ENCOUNTER
A/P from 9/21/20:     (I26.09) First lifetime VTE presenting as acute pulmonary embolism with acute cor pulmonale, arising form RLE DVT, S/P PA suction thrombectomy and IVC filter placement 8/12/2020.  (primary encounter diagnosis)  Comment: no IVC or leg thrombus  Plan: retrieve filter in next week; stay on Xarelto until April. Will drive back here from [place] for d dimer, RLE duplex, TTE all to be done on same day with f/u in person visit with me same day. Decision at that time regarding Xarelto cessation versus continuation but with dose reduction to 10 mg daily.       Patient will call in February to schedule     D-dimer    RLE Venous duplex    Complete Echo    F/u in clinic with Dr. Teo Holloway RN BSN  [facility]  [phone]

## 2020-09-23 NOTE — TELEPHONE ENCOUNTER
[facility]    Who is the name of the provider?:  Teo      What is the location you see this provider at?: [place]    Reason for call:  Saw Dr. Bruce 9/21.  Question re plan for follow up testing for blood thinners.  Testing 6 months from start of blood thinners?    Can we leave a detailed message on this number?  YES

## 2020-09-25 DIAGNOSIS — I26.99 BILATERAL PULMONARY EMBOLISM (H): ICD-10-CM

## 2020-09-25 DIAGNOSIS — I82.4Z1 ACUTE DEEP VEIN THROMBOSIS (DVT) OF DISTAL VEIN OF RIGHT LOWER EXTREMITY (H): ICD-10-CM

## 2020-10-03 DIAGNOSIS — Z11.59 ENCOUNTER FOR SCREENING FOR OTHER VIRAL DISEASES: ICD-10-CM

## 2020-10-03 PROCEDURE — U0003 INFECTIOUS AGENT DETECTION BY NUCLEIC ACID (DNA OR RNA); SEVERE ACUTE RESPIRATORY SYNDROME CORONAVIRUS 2 (SARS-COV-2) (CORONAVIRUS DISEASE [COVID-19]), AMPLIFIED PROBE TECHNIQUE, MAKING USE OF HIGH THROUGHPUT TECHNOLOGIES AS DESCRIBED BY CMS-2020-01-R: HCPCS | Performed by: INTERNAL MEDICINE

## 2020-10-04 LAB
SARS-COV-2 RNA SPEC QL NAA+PROBE: NOT DETECTED
SPECIMEN SOURCE: NORMAL

## 2020-10-06 ENCOUNTER — HOSPITAL ENCOUNTER (OUTPATIENT)
Dept: INTERVENTIONAL RADIOLOGY/VASCULAR | Facility: CLINIC | Age: 80
End: 2020-10-06
Attending: INTERNAL MEDICINE | Admitting: RADIOLOGY
Payer: MEDICARE

## 2020-10-06 ENCOUNTER — HOSPITAL ENCOUNTER (OUTPATIENT)
Facility: CLINIC | Age: 80
Discharge: HOME | End: 2020-10-06
Attending: RADIOLOGY | Admitting: RADIOLOGY
Payer: MEDICARE

## 2020-10-06 VITALS
OXYGEN SATURATION: 100 % | TEMPERATURE: 98.1 F | RESPIRATION RATE: 20 BRPM | DIASTOLIC BLOOD PRESSURE: 56 MMHG | WEIGHT: 125 LBS | HEIGHT: 62 IN | HEART RATE: 69 BPM | BODY MASS INDEX: 23 KG/M2 | SYSTOLIC BLOOD PRESSURE: 110 MMHG

## 2020-10-06 VITALS
SYSTOLIC BLOOD PRESSURE: 108 MMHG | DIASTOLIC BLOOD PRESSURE: 66 MMHG | OXYGEN SATURATION: 95 % | HEART RATE: 53 BPM | RESPIRATION RATE: 12 BRPM

## 2020-10-06 DIAGNOSIS — I82.409 DVT (DEEP VENOUS THROMBOSIS) (H): ICD-10-CM

## 2020-10-06 DIAGNOSIS — I82.409 DVT (DEEP VENOUS THROMBOSIS) (H): Primary | ICD-10-CM

## 2020-10-06 LAB
APTT PPP: 32 SEC (ref 22–37)
CREAT SERPL-MCNC: 0.66 MG/DL (ref 0.52–1.04)
ERYTHROCYTE [DISTWIDTH] IN BLOOD BY AUTOMATED COUNT: 13.4 % (ref 10–15)
GFR SERPL CREATININE-BSD FRML MDRD: 84 ML/MIN/{1.73_M2}
HCT VFR BLD AUTO: 39.9 % (ref 35–47)
HGB BLD-MCNC: 13 G/DL (ref 11.7–15.7)
INR PPP: 1.19 (ref 0.86–1.14)
MCH RBC QN AUTO: 33.5 PG (ref 26.5–33)
MCHC RBC AUTO-ENTMCNC: 32.6 G/DL (ref 31.5–36.5)
MCV RBC AUTO: 103 FL (ref 78–100)
PLATELET # BLD AUTO: 364 10E9/L (ref 150–450)
RBC # BLD AUTO: 3.88 10E12/L (ref 3.8–5.2)
WBC # BLD AUTO: 5.9 10E9/L (ref 4–11)

## 2020-10-06 PROCEDURE — 999N000163 HC STATISTIC SIMPLE TUBE INSERTION/CHARGE, PORT, CATH, FISTULOGRAM

## 2020-10-06 PROCEDURE — C1769 GUIDE WIRE: HCPCS

## 2020-10-06 PROCEDURE — 37193 REM ENDOVAS VENA CAVA FILTER: CPT

## 2020-10-06 PROCEDURE — 85730 THROMBOPLASTIN TIME PARTIAL: CPT | Performed by: RADIOLOGY

## 2020-10-06 PROCEDURE — C1773 RET DEV, INSERTABLE: HCPCS

## 2020-10-06 PROCEDURE — 250N000009 HC RX 250: Performed by: PHYSICIAN ASSISTANT

## 2020-10-06 PROCEDURE — 250N000011 HC RX IP 250 OP 636: Performed by: RADIOLOGY

## 2020-10-06 PROCEDURE — 255N000002 HC RX 255 OP 636: Performed by: RADIOLOGY

## 2020-10-06 PROCEDURE — 82565 ASSAY OF CREATININE: CPT | Performed by: RADIOLOGY

## 2020-10-06 PROCEDURE — 272N000196 HC ACCESSORY CR5

## 2020-10-06 PROCEDURE — 272N000116 HC CATH CR1

## 2020-10-06 PROCEDURE — 85027 COMPLETE CBC AUTOMATED: CPT | Performed by: RADIOLOGY

## 2020-10-06 PROCEDURE — 85610 PROTHROMBIN TIME: CPT | Performed by: RADIOLOGY

## 2020-10-06 PROCEDURE — 250N000011 HC RX IP 250 OP 636: Performed by: PHYSICIAN ASSISTANT

## 2020-10-06 RX ORDER — NALOXONE HYDROCHLORIDE 0.4 MG/ML
.1-.4 INJECTION, SOLUTION INTRAMUSCULAR; INTRAVENOUS; SUBCUTANEOUS
Status: DISCONTINUED | OUTPATIENT
Start: 2020-10-06 | End: 2020-10-07 | Stop reason: HOSPADM

## 2020-10-06 RX ORDER — LIDOCAINE 40 MG/G
CREAM TOPICAL
Status: DISCONTINUED | OUTPATIENT
Start: 2020-10-06 | End: 2020-10-06 | Stop reason: HOSPADM

## 2020-10-06 RX ORDER — FLUMAZENIL 0.1 MG/ML
0.2 INJECTION, SOLUTION INTRAVENOUS
Status: DISCONTINUED | OUTPATIENT
Start: 2020-10-06 | End: 2020-10-07 | Stop reason: HOSPADM

## 2020-10-06 RX ORDER — DEXTROSE MONOHYDRATE 25 G/50ML
25-50 INJECTION, SOLUTION INTRAVENOUS
Status: DISCONTINUED | OUTPATIENT
Start: 2020-10-06 | End: 2020-10-06 | Stop reason: HOSPADM

## 2020-10-06 RX ORDER — IOPAMIDOL 612 MG/ML
50 INJECTION, SOLUTION INTRAVASCULAR ONCE
Status: COMPLETED | OUTPATIENT
Start: 2020-10-06 | End: 2020-10-06

## 2020-10-06 RX ORDER — HEPARIN SODIUM 200 [USP'U]/100ML
1 INJECTION, SOLUTION INTRAVENOUS CONTINUOUS PRN
Status: DISCONTINUED | OUTPATIENT
Start: 2020-10-06 | End: 2020-10-06 | Stop reason: HOSPADM

## 2020-10-06 RX ORDER — NICOTINE POLACRILEX 4 MG
15-30 LOZENGE BUCCAL
Status: DISCONTINUED | OUTPATIENT
Start: 2020-10-06 | End: 2020-10-06 | Stop reason: HOSPADM

## 2020-10-06 RX ORDER — SODIUM CHLORIDE 9 MG/ML
INJECTION, SOLUTION INTRAVENOUS CONTINUOUS
Status: DISCONTINUED | OUTPATIENT
Start: 2020-10-06 | End: 2020-10-06 | Stop reason: HOSPADM

## 2020-10-06 RX ORDER — FENTANYL CITRATE 50 UG/ML
25-50 INJECTION, SOLUTION INTRAMUSCULAR; INTRAVENOUS EVERY 5 MIN PRN
Status: DISCONTINUED | OUTPATIENT
Start: 2020-10-06 | End: 2020-10-07 | Stop reason: HOSPADM

## 2020-10-06 RX ADMIN — MIDAZOLAM HYDROCHLORIDE 1 MG: 1 INJECTION, SOLUTION INTRAMUSCULAR; INTRAVENOUS at 09:10

## 2020-10-06 RX ADMIN — HEPARIN SODIUM 1 BAG: 200 INJECTION, SOLUTION INTRAVENOUS at 09:13

## 2020-10-06 RX ADMIN — FENTANYL CITRATE 12.5 MCG: 50 INJECTION, SOLUTION INTRAMUSCULAR; INTRAVENOUS at 09:33

## 2020-10-06 RX ADMIN — IOPAMIDOL 10 ML: 612 INJECTION, SOLUTION INTRAVENOUS at 09:48

## 2020-10-06 RX ADMIN — LIDOCAINE HYDROCHLORIDE 5 ML: 10 INJECTION, SOLUTION INFILTRATION; PERINEURAL at 09:32

## 2020-10-06 RX ADMIN — FENTANYL CITRATE 12.5 MCG: 50 INJECTION, SOLUTION INTRAMUSCULAR; INTRAVENOUS at 09:30

## 2020-10-06 RX ADMIN — FENTANYL CITRATE 50 MCG: 50 INJECTION, SOLUTION INTRAMUSCULAR; INTRAVENOUS at 09:10

## 2020-10-06 RX ADMIN — MIDAZOLAM HYDROCHLORIDE 0.5 MG: 1 INJECTION, SOLUTION INTRAMUSCULAR; INTRAVENOUS at 09:29

## 2020-10-06 ASSESSMENT — MIFFLIN-ST. JEOR: SCORE: 995.25

## 2020-10-06 NOTE — PROGRESS NOTES
Care Suites Discharge Nursing Note    Patient Information  Name: Asher Sandra  Age: 80 year old    Discharge Education:  Discharge instructions reviewed: Yes  Additional education/resources provided: none  Patient/patient representative verbalizes understanding: Yes  Patient discharging on new medications: No  Medication education completed: N/A    Discharge Plans:   Discharge location: home  Discharge ride contacted: Yes  Approximate discharge time: 1050    Discharge Criteria:  Discharge criteria met and vital signs stable: Yes    Patient Belongs:  Patient belongings returned to patient: Yes    Heide Redman RN

## 2020-10-06 NOTE — PROGRESS NOTES
Pt up at bedside with stand by assist and tolerated activity well.  Denies dizziness with activity. VSS and puncture site without bleeding or hematoma. All questions answered prior to discharge to home. Pt will discharge with family driving.

## 2020-10-06 NOTE — PRE-PROCEDURE
GENERAL PRE-PROCEDURE:   Procedure:  Inferior vena cava filter removal, moderate sedation  Date/Time:  10/6/2020 8:37 AM    Written consent obtained?: Yes    Risks and benefits: Risks, benefits and alternatives were discussed    Consent given by:  Patient  Patient states understanding of procedure being performed: Yes    Patient's understanding of procedure matches consent: Yes    Procedure consent matches procedure scheduled: Yes    Expected level of sedation:  Moderate  Appropriately NPO:  Yes  ASA Class:  Class 2- mild systemic disease, no acute problems, no functional limitations  Mallampati  :  Grade 1- soft palate, uvula, tonsillar pillars, and posterior pharyngeal wall visible  Lungs:  Lungs clear with good breath sounds bilaterally  Heart:  Normal heart sounds and rate  History & Physical reviewed:  History and physical reviewed and no updates needed  Statement of review:  I have reviewed the lab findings, diagnostic data, medications, and the plan for sedation

## 2020-10-06 NOTE — PROGRESS NOTES
PATIENT/VISITOR WELLNESS SCREENING    Step 1 Patient Screening    1. In the last month, have you been in contact with someone who was confirmed or suspected to have Coronavirus/COVID-19? No    2. Do you have the following symptoms?  Fever/Chills? No   Cough? No   Shortness of breath? No   New loss of taste or smell? No  Sore throat? No  Muscle or body aches? No  Headaches? No  Fatigue? No  Vomiting or diarrhea? No    Step 2 Visitor Screening    1. Name of Visitor (1 visitor per patient): Rex     2. In the last month, have you been in contact with someone who was confirmed or suspected to have Coronavirus/COVID-19? No    3. Do you have the following symptoms?  Fever/Chills? No   Cough? No   Shortness of breath? No   Skin rash? No   Loss of taste or smell? No  Sore throat? No  Runny or stuffy nose? No  Muscle or body aches? No  Headaches? No  Fatigue? No  Vomiting or diarrhea? NO    If the visitor has positive symptoms, notify supervisor/manger  Per policy, the visitor will need to leave the facility     Step 3 Refer to logic grid below for actions    NO SYMPTOM(S)    ACTIONS:  1. Standard rooming process  2. Provider to assess per normal protocol  3. Implement precautions as needed and per guidelines     POSITIVE SYMPTOM(S)  If positive for ANY of the following symptoms: fever, cough, shortness of breath, rash    ACTION:  1. Continue to have the patient wear a mask   2. Room patient as soon as possible  3. Don appropriate PPE when entering room  4. Provider evaluation

## 2020-10-06 NOTE — PROGRESS NOTES
Care Suites Admission Nursing Note    Patient Information  Name: Asher Sandra  Age: 80 year old  Reason for admission: IVC filter removal  Care Suites arrival time: 0800    Visitor Information  Name: Rex   Informed of visitor restrictions:  YEs  1 visitor allowed per patient   Visitor must screen negative for COVID symptoms   Visitor must wear a mask  Waiting rooms closed to visitors    Patient Admission/Assessment   Pre-procedure assessment complete: Yes  If abnormal assessment/labs, provider notified: pending  NPO: Yes  Medications held per instructions/orders: N/A  Consent: obtained  If applicable, pregnancy test status: N/A  Patient oriented to room: Yes  Education/questions answered: Yes  Plan/other: per procedural plan of care    Discharge Planning  Discharge name/phone number: Spouse here with pt  Overnight post sedation caregiver: yes  Discharge location: home    Heide Redman RN

## 2020-10-06 NOTE — IR NOTE
Interventional Radiology Intra-procedural Nursing Note    Patient Name: Asher Sandra  Medical Record Number: [id number]  Today's Date: October 6, 2020    Start Time: 0910  End of procedure time:0943  Procedure: Inferior Vena Cava Filter Removal  Report given to: Heide Care [facility] RN  Time pt departs:  0953    Other Notes: Pt came to IR suite 1 on a cart and was moved over to the table using an air mat.  Pt was draped and prepped with chlorhexidine soap to the neck. Pt received versed and fentanyl for comfort.     Medications: Last Dose: 0933    Fentanyl 75 mcg  Versed 1.5 mg  Lidocaine 5 ml's  Kwan Mcdonald RN

## 2020-10-06 NOTE — PROCEDURES
[facility]    Procedure: IVC filter removal    Date/Time: 10/6/2020 10:03 AM  Performed by: Tana Rea DO  Authorized by: Tana Rea DO     UNIVERSAL PROTOCOL   Site Marked: Yes  Prior Images Obtained and Reviewed:  Yes  Required items: Required blood products, implants, devices and special equipment available    Patient identity confirmed:  Verbally with patient, arm band, provided demographic data and hospital-assigned identification number  Patient was reevaluated immediately before administering moderate or deep sedation or anesthesia  Confirmation Checklist:  Patient's identity using two indicators, relevant allergies, procedure was appropriate and matched the consent or emergent situation and correct equipment/implants were available  Time out: Immediately prior to the procedure a time out was called    Universal Protocol: the Joint Commission Universal Protocol was followed    Preparation: Patient was prepped and draped in usual sterile fashion           ANESTHESIA    Anesthesia: Local infiltration  Local Anesthetic:  Lidocaine 1% without epinephrine      SEDATION    Patient Sedated: Yes    Sedation Type:  Moderate (conscious) sedation  Vital signs: Vital signs monitored during sedation    See dictated procedure note for full details.  Findings: IVC filter removal    Specimens: none    Complications: None    Condition: Stable    PROCEDURE   Patient Tolerance:  Patient tolerated the procedure well with no immediate complications    Length of time physician/provider present for 1:1 monitoring during sedation: 20

## 2020-10-06 NOTE — DISCHARGE INSTRUCTIONS
IVC Filter Removal Discharge Instructions - Neck    After you go home:      Have an adult stay with you until tomorrow.    Drink extra fluids for 2 days.    You may resume your normal diet.    No smoking       For 24 hours - due to the sedation you received:    Relax and take it easy.    Do NOT make any important or legal decisions.    Do NOT drive or operate machines at home or at work.    Do NOT drink alcohol.    Care of Neck Puncture Site:      For the first 24 hrs - check the puncture site every 1-2 hours while awake.    Remove the bandaid after 24 hours. If there is minor oozing, apply another bandaid and remove it after 12 hours.    It is normal to have a small bruise or soreness at the site.    You may shower tomorrow. Do NOT take a bath, or use a hot tub or pool for at least 3 days. Do NOT scrub the site. Do not use lotion or powder near the puncture site.    Activity:            For 2 days:    Do NOT do any heavy activity such as exercise, lifting, or straining.    No housework, yard work or any activity that make you sweat    Do NOT lift more than 10 pounds    Avoid heavy lifting or the overuse of your shoulder for three days.           Bleeding:      If you start bleeding from the site in your neck, sit down and press gently on the site for 10 minutes.     Once bleeding stops, sit still for 2 hours.     Call [facility] as soon as you can.       Call 911 right away if you have heavy bleeding or bleeding that does not stop.      Medicines:       Do  not stop taking iXarelto until you talk to your provider.       Take your medications, including blood thinners, unless your provider tells you not to.      If you have stopped any medicines, check with your provider about when to restart them.    Follow Up Appointments:      Follow up with your primary care provider as needed.    Call the clinic if:      You have increased pain or a large or growing hard lump around the site.    The site is red,  swollen, hot or tender.    Blood or fluid is draining from the site.    You have chills or a fever greater than 101 F (38 C).    You have hives, a rash or unusual itching.    Any questions or concerns.        If you have questions or your original symptoms do not improve, call:        [facility] @ [phone]

## 2020-10-06 NOTE — PROGRESS NOTES
Pre procedure plan of care reviewed with pt and spouse prior to sedation.  All questions answered and pt appears to accept and understand. Discharge instructions also reviewed prior to sedation.

## 2020-10-06 NOTE — PROGRESS NOTES
Pt returned from IR after IVC filter removal. Denies pain, nausea or vomiting.  Given po fluids and crackers. Family at bedside.  No bleeding or hematoma from site .

## 2020-12-17 ENCOUNTER — TELEPHONE (OUTPATIENT)
Dept: OTHER | Facility: CLINIC | Age: 80
End: 2020-12-17

## 2020-12-17 DIAGNOSIS — I26.99 BILATERAL PULMONARY EMBOLISM (H): ICD-10-CM

## 2020-12-17 DIAGNOSIS — I82.4Z1 ACUTE DEEP VEIN THROMBOSIS (DVT) OF DISTAL VEIN OF RIGHT LOWER EXTREMITY (H): ICD-10-CM

## 2020-12-17 NOTE — TELEPHONE ENCOUNTER
Rx transfer to [place], [place].  Amie Jara RN BSN  [facility]  [phone]

## 2020-12-17 NOTE — TELEPHONE ENCOUNTER
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  Filter removal 10/6/20.    Patient is currently on Xarelto 20 mg daily.  (Rx 9/25/20 Q90 R2).    [id number] - [place], [place] - [address] AT [facility] [address]    Per LOV with Dr. Bruce note 9/21/20, patient was to stay on Xarelto until April 2021.  AT that time, she was to return to [place] for a d-dimer, RLE Venous Duplex, and echocardiogram.    Called patient to let her know that she should still have 2 refill available through [facility].  Asher will call us back if she is unable to obtain the prescription.    Amie Jara RN BSN  [facility]  [phone]

## 2021-03-16 ENCOUNTER — TELEPHONE (OUTPATIENT)
Dept: OTHER | Facility: CLINIC | Age: 81
End: 2021-03-16

## 2021-03-16 DIAGNOSIS — I26.99 BILATERAL PULMONARY EMBOLISM (H): ICD-10-CM

## 2021-03-16 DIAGNOSIS — I82.4Z1 ACUTE DEEP VEIN THROMBOSIS (DVT) OF DISTAL VEIN OF RIGHT LOWER EXTREMITY (H): ICD-10-CM

## 2021-03-16 NOTE — TELEPHONE ENCOUNTER
Asher called stating that she was given enough Zarelto 20mg    rivaroxaban ANTICOAGULANT (XARELTO) 20 MG TABS tablet 20 mg, DAILY WITH SUPPER 1 ordered     ( to get her through 4/3/2021.  Her appointment with Dr. Bruce is on 4/6/2021.    Asher is wondering it she can get a refill or if being off the 3 days between will be ok.     If a refill will be sent, please send to:  Pharmacy   [place] [id number]  PH: [phone]    Please call Asher to let her know how to proceed with mediation.     Please call on cell: [phone].    Mimi TREADWELL

## 2021-03-16 NOTE — TELEPHONE ENCOUNTER
Rx for 10 day supply of Xarelto provided per [facility]: Dr. Bruce.    Patient notified.    Amie Jraa RN BSN  [facility]  [phone]

## 2021-04-06 ENCOUNTER — HOSPITAL ENCOUNTER (OUTPATIENT)
Dept: ULTRASOUND IMAGING | Facility: CLINIC | Age: 81
End: 2021-04-06
Attending: INTERNAL MEDICINE
Payer: MEDICARE

## 2021-04-06 ENCOUNTER — TELEPHONE (OUTPATIENT)
Dept: OTHER | Facility: CLINIC | Age: 81
End: 2021-04-06

## 2021-04-06 ENCOUNTER — HOSPITAL ENCOUNTER (OUTPATIENT)
Dept: CARDIOLOGY | Facility: CLINIC | Age: 81
End: 2021-04-06
Attending: INTERNAL MEDICINE
Payer: MEDICARE

## 2021-04-06 ENCOUNTER — OFFICE VISIT (OUTPATIENT)
Dept: OTHER | Facility: CLINIC | Age: 81
End: 2021-04-06
Attending: INTERNAL MEDICINE
Payer: MEDICARE

## 2021-04-06 VITALS
WEIGHT: 126 LBS | BODY MASS INDEX: 23.19 KG/M2 | SYSTOLIC BLOOD PRESSURE: 145 MMHG | DIASTOLIC BLOOD PRESSURE: 70 MMHG | RESPIRATION RATE: 16 BRPM | HEIGHT: 62 IN | HEART RATE: 65 BPM | OXYGEN SATURATION: 97 %

## 2021-04-06 DIAGNOSIS — I82.409 DVT (DEEP VENOUS THROMBOSIS) (H): ICD-10-CM

## 2021-04-06 DIAGNOSIS — I26.09 ACUTE PULMONARY EMBOLISM WITH ACUTE COR PULMONALE, UNSPECIFIED PULMONARY EMBOLISM TYPE (H): ICD-10-CM

## 2021-04-06 DIAGNOSIS — I82.409 ACUTE DEEP VEIN THROMBOSIS (DVT) OF OTHER VEIN OF LOWER EXTREMITY, UNSPECIFIED LATERALITY (H): ICD-10-CM

## 2021-04-06 DIAGNOSIS — I27.82 CHRONIC PULMONARY EMBOLISM, UNSPECIFIED PULMONARY EMBOLISM TYPE, UNSPECIFIED WHETHER ACUTE COR PULMONALE PRESENT (H): ICD-10-CM

## 2021-04-06 DIAGNOSIS — I26.99 PULMONARY EMBOLISM (H): ICD-10-CM

## 2021-04-06 DIAGNOSIS — I82.409 ACUTE DEEP VEIN THROMBOSIS (DVT) OF OTHER VEIN OF LOWER EXTREMITY, UNSPECIFIED LATERALITY (H): Primary | ICD-10-CM

## 2021-04-06 LAB — D DIMER PPP FEU-MCNC: 0.5 UG/ML FEU (ref 0–0.5)

## 2021-04-06 PROCEDURE — 99214 OFFICE O/P EST MOD 30 MIN: CPT | Performed by: INTERNAL MEDICINE

## 2021-04-06 PROCEDURE — 93306 TTE W/DOPPLER COMPLETE: CPT

## 2021-04-06 PROCEDURE — 85379 FIBRIN DEGRADATION QUANT: CPT | Performed by: INTERNAL MEDICINE

## 2021-04-06 PROCEDURE — 36415 COLL VENOUS BLD VENIPUNCTURE: CPT | Performed by: INTERNAL MEDICINE

## 2021-04-06 PROCEDURE — G0463 HOSPITAL OUTPT CLINIC VISIT: HCPCS | Mod: 25

## 2021-04-06 PROCEDURE — 93971 EXTREMITY STUDY: CPT | Mod: RT

## 2021-04-06 PROCEDURE — 93306 TTE W/DOPPLER COMPLETE: CPT | Mod: 26 | Performed by: INTERNAL MEDICINE

## 2021-04-06 ASSESSMENT — MIFFLIN-ST. JEOR: SCORE: 999.78

## 2021-04-06 NOTE — PROGRESS NOTES
Asher Sandra is a 80 year old female who is presenting at the current time to discuss her diagnosi(es) of          DVT (deep venous thrombosis) (H)  Pulmonary embolism (H)         HPI: Asher Sandra is a 80 year old female who presented 8/10/2020 with an 8 day history of shortness of breath on exertion, chest pain and right lower extremity discomfort. Imaging did show bilateral pulmonary emboli with evidence for right heart strain. BNP and troponin were also elevated. An echocardiogram revealed right heart strain. Ultrasound of the lower extremities was significant for DVT in the right lower extremity from the popliteal vein to calf veins. She was started on IV heparin and admitted to the hospital.      On 8/12/2020 she went tto Interventional Radiology for IVC filter placement and pulmonary artery mechanical thrombectomy.        She was discharged on 8/14/2020 with a prescription for Xarelto as well as Sigvaris 20-30 mmHg thigh high compression stockings, which she is  wearing 20 plus hours per day.      She has had superficial venous thrombosis in the past, with the first episode being in 1967 after childbirth. She has no personal or family history of DVT or known clotting disorders. She has not traveled recently or undergone any surgical procedures. She has been rather active with trying to get her condo ready to move out of. She states she is up to date on mammograms and colonoscopies. No thrombophilia evaluation was therefore warranted.     Since discharge , she has been doing well. F/U imaging as below revealed no IVC or intrafilter thrombus. IVC filter was subsequently retrieved. Duplex now reveals resorption of DVT. TTE reveals no significant pulmonary htn. D dimer is presently pending.        Review Of Systems  Skin: negative  Eyes: negative  Ears/Nose/Throat: negative  Respiratory: No shortness of breath, dyspnea on exertion, cough, or hemoptysis  Cardiovascular:  negative  Gastrointestinal: negative  Genitourinary: negative  Musculoskeletal: negative  Neurologic: negative  Psychiatric: negative  Hematologic/Lymphatic/Immunologic: negative  Endocrine: negative      PAST MEDICAL HISTORY:                No past medical history on file.    PAST SURGICAL HISTORY:                  Past Surgical History:   Procedure Laterality Date     IR IVC FILTER PLACEMENT  8/12/2020     IR IVC FILTER REMOVAL  10/6/2020     IR PULMONARY ANGIOGRAM BILATERAL  8/12/2020       CURRENT MEDICATIONS:                  Current Outpatient Medications   Medication Sig Dispense Refill     amLODIPine (NORVASC) 5 MG tablet Take 5 mg by mouth 2 times daily       atorvastatin (LIPITOR) 10 MG tablet Take 10 mg by mouth daily       butalbital-APAP-caffeine-codeine (FIORICET WITH CODEINE) -41-30 MG per capsule Take 1 capsule by mouth 2 times daily as needed for migraine (chronic pain)       calcium carbonate 600 mg-vitamin D 400 units (CALTRATE) 600-400 MG-UNIT per tablet Take 1 tablet by mouth daily        carvedilol (COREG) 6.25 MG tablet Take 6.25 mg by mouth 2 times daily (with meals)       fish oil-omega-3 fatty acids 1000 MG capsule Take 1 g by mouth daily        meclizine (ANTIVERT) 25 MG tablet Take 25 mg by mouth 2 times daily as needed for dizziness (vertigo)       omeprazole (PRILOSEC) 20 MG DR capsule Take 20 mg by mouth daily        ondansetron (ZOFRAN) 4 MG tablet Take 4 mg by mouth every 8 hours as needed (vertigo)       oxybutynin ER (DITROPAN-XL) 10 MG 24 hr tablet Take 10 mg by mouth daily       rivaroxaban ANTICOAGULANT (XARELTO) 20 MG TABS tablet Take 1 tablet (20 mg) by mouth daily (with dinner) 10 tablet 0       ALLERGIES:                  Allergies   Allergen Reactions     Sulfa Drugs        SOCIAL HISTORY:                  Social History     Socioeconomic History     Marital status:      Spouse name: Not on file     Number of children: Not on file     Years of education: Not on  file     Highest education level: Not on file   Occupational History     Not on file   Social Needs     Financial resource strain: Not on file     Food insecurity     Worry: Not on file     Inability: Not on file     Transportation needs     Medical: Not on file     Non-medical: Not on file   Tobacco Use     Smoking status: Never Smoker     Smokeless tobacco: Never Used   Substance and Sexual Activity     Alcohol use: Yes     Frequency: 4 or more times a week     Drug use: Never     Sexual activity: Not on file   Lifestyle     Physical activity     Days per week: Not on file     Minutes per session: Not on file     Stress: Not on file   Relationships     Social connections     Talks on phone: Not on file     Gets together: Not on file     Attends Confucianism service: Not on file     Active member of club or organization: Not on file     Attends meetings of clubs or organizations: Not on file     Relationship status: Not on file     Intimate partner violence     Fear of current or ex partner: Not on file     Emotionally abused: Not on file     Physically abused: Not on file     Forced sexual activity: Not on file   Other Topics Concern     Not on file   Social History Narrative     Not on file       FAMILY HISTORY:                 No family history on file.      Physical exam Reveals:    O/P: WNL  HEENT: WNL  NECK: No JVD, thyromegaly, or lymphadenopathy  HEART: RRR, no murmurs, gallops, or rubs  LUNGS: CTA bilaterally without rales, wheezes, or rhonchi  GI: NABS, nondistended, nontender, soft  EXT:without cyanosis, clubbing, or edema  NEURO: nonfocal  : no flank tenderness      [id number]  [id number]  [id number]  [id number]^BOO^DYLAN^ESTELA     [facility]  [facility]  Echocardiography [facility]  [address]  [address]  [place] [address]  Phone [phone]  Fax [phone]     Name: DOROTHY ATKINS  MRN: [id number]  : 1940  Study Date: 2021  09:17 AM  Age: 80 yrs  Gender: Female  Patient Location: [facility]  Reason For Study: Pulmonary embolism (H)  Ordering Physician: DYLAN HADDAD  Referring Physician: DYLAN HADDAD  Performed By: Latrice Barrera     BSA: 1.6 m2  Height: 62 in  Weight: 125 lb  HR: 59  BP: 122/64 mmHg  ______________________________________________________________________________  Procedure  Complete Echo Adult.     ______________________________________________________________________________  Interpretation Summary     1. The left ventricle is normal in structure, function and size. The visual  ejection fraction is estimated at 60%.  2. The right ventricle is normal in structure, function and size.  3. There is mild (1+) tricuspid regurgitation. The right ventricular systolic  pressure is approximated at 20mmHg plus the right atrial pressure.     Echo from 8/2020 showed EF 60%, moderate RV dilation with mild hypokinesis, 4+  TR, RVSP 50mmHg.  ______________________________________________________________________________  Left Ventricle  The left ventricle is normal in structure, function and size. There is normal  left ventricular wall thickness. The visual ejection fraction is estimated at  60%. Left ventricular diastolic function is normal. Normal left ventricular  wall motion.     Right Ventricle  The right ventricle is normal in structure, function and size.     Atria  Normal left atrial size. Right atrial size is normal. There is no atrial shunt  seen.     Mitral Valve  The mitral valve is normal in structure and function.     Tricuspid Valve  There is mild (1+) tricuspid regurgitation. The right ventricular systolic  pressure is approximated at 20mmHg plus the right atrial pressure.     Aortic Valve  The aortic valve is normal in structure and function.     Pulmonic Valve  The pulmonic valve is normal in structure and function.     Vessels  Normal ascending, transverse (arch), and descending aorta. The inferior  vena  cava was normal in size with preserved respiratory variability.     Pericardium  There is no pericardial effusion.     Rhythm  Sinus rhythm was noted.     ______________________________________________________________________________  MMode/2D Measurements & Calculations  IVSd: 0.84 cm  LVIDd: 4.1 cm  LVIDs: 2.4 cm  LVPWd: 0.87 cm  FS: 41.8 %  LV mass(C)d: 106.5 grams  LV mass(C)dI: 68.0 grams/m2  Ao root diam: 2.8 cm  LA dimension: 3.5 cm     asc Aorta Diam: 3.0 cm  LA/Ao: 1.2  LA Volume (BP): 48.0 ml  LA Volume Index (BP): 30.6 ml/m2  RWT: 0.42  TAPSE: 3.0 cm     Doppler Measurements & Calculations  MV E max karen: 60.6 cm/sec  MV A max karen: 75.2 cm/sec  MV E/A: 0.81  MV dec time: 0.22 sec     TR max karen: 223.3 cm/sec  TR max P.9 mmHg  E/E' av.1  Lateral E/e': 7.3  Medial E/e': 9.0     ______________________________________________________________________________  Report approved by: Henry Bernard 2021 10:28 AM                  Specimen Collected: 21  9:17 AM Last Resulted: 21  9:17 AM Order Details View Encounter Lab and Collection Details Routing Result History         Linked Documents    View Image         All Reviewers List    Juan Bruce MD on 2021 10:36 AM   Encounter    View Encounter          Lab Information    CARDIOLOGY RESULTS        Signed    Electronically signed by Urbano Rea MD on 21 at 1028 CDT           VENOUS ULTRASOUND RIGHT LOWER EXTREMITY 2021 9:06 AM      HISTORY: DVT (deep venous thrombosis) (H). Acute pulmonary embolism  with acute cor pulmonale, unspecified pulmonary embolism type (H).     COMPARISON: 2020.     TECHNIQUE: Color Doppler and spectral waveform analysis performed  throughout the deep veins of the right lower extremity.     FINDINGS: The right common femoral, proximal greater saphenous,  femoral, and popliteal veins demonstrate normal blood flow,  compression, and  augmentation. Posterior tibial and peroneal veins are  compressible. Visualized right external iliac vein is patent.  Contralateral left visualized external iliac and common femoral vein  segments are patent.                                                                      IMPRESSION: Negative for deep venous thrombosis in the right lower  extremity.      PROCEDURE:  1. Inferior venacavogram.  2. Retrieval of an infrarenal inferior vena cava filter.     DATE: 10/6/2020     MODERATE SEDATION: Versed 1.5 mg IV; Fentanyl 75 mcg IV. During the  time out, immediately prior to the administration of medications, the  patient was reassessed for adequacy to receive conscious sedation.   Under physician supervision, Versed and fentanyl were administered for  moderate sedation. Pulse oximetry, heart rate and blood pressure were  continuously monitored by an independent trained observer. The  physician spent 33 minutes of face-to-face sedation time with the  patient.     FLUORO: 3.9 minutes.      AIR KERMA: 24.99 mGy.     CONTRAST: 10 mL Isovue IV.     CLINICAL HISTORY: 80-year-old female with a history of prior  retrievable inferior vena cava filter placement, now presenting for  filter retrieval following anticoagulation.     PROCEDURE/FINDINGS: After informed consent was obtained from the  patient, the patient was brought to the interventional radiology suite  and placed supine. The right neck was prepped and draped in the normal  sterile fashion.      1% lidocaine was used for local anesthesia. Limited preprocedure  ultrasound of the right internal jugular vein was performed, images  show the right internal jugular vein is patent. An image was archived.  Under direct ultrasound guidance a 21-gauge micropuncture needle was  advanced into the internal jugular vein. A 0.018 wire was advanced  through the needle and exchange was made for a 5 Yemeni micropuncture  sheath.      A 0.035 guidewire was advanced into the inferior  vena cava. A 5 Marshallese  flush catheter advanced over the wire into the inferior vena cava  beyond the prior placed filter, and into the inferior vena cava at the  iliac confluence. An inferior venacavogram was performed.     The inferior venacavogram demonstrates conventional caval anatomy  without duplication of the IVC or of the renal veins. No thrombus is  identified within the inferior vena cava or iliac veins. The  pre-existing retrievable inferior vena cava filter was unchanged in  position, and demonstrated no evidence for significant migration or  tilt.     The flush catheter was then removed over a wire, and 9 Marshallese vascular  and 7 Marshallese sheaths were advanced over the wire, with the sheath  immediately above the filter retrieval hook. A gooseneck snare was  then advanced through the sheath, and was utilized to snare the  retrieval hook. The sheath was then advanced over the filter, with  collapse of the filter tines. The filter was then removed with gentle  traction, and removed in entirety.      The patient tolerated the procedure well and there were no immediate  postprocedure complications. The patient's vital signs were monitored  by radiology nursing staff under my supervision and remained stable  throughout the study.                                                                       IMPRESSION: Successful and uncomplicated retrieval of an inferior vena  cava filter.     TUCKER TORRES DO         ULTRASOUND AORTA/IVC/ILIAC DUPLEX COMPLETE;   ULTRASOUND RIGHT LOWER EXTREMITY VENOUS DUPLEX  9/9/2020 10:57 AM     CLINICAL HISTORY/INDICATION: IVC filter. Evaluate for timing of  removal. Acute pulmonary embolism with acute cor pulmonale,  unspecified pulmonary embolism type (H).     COMPARISON: Ultrasound 8/20/2020     TECHNIQUE:   Grayscale, color-flow, and spectral waveform analysis were performed  of the deep veins of the right lower extremity     FINDINGS: The inferior vena cava is patent  without evidence of  thrombus. The right external iliac vein is patent. Left iliac vein is  not visualized. Bilateral external iliac veins and common femoral  veins are patent.     The right femoral vein, popliteal vein, posterior tibial and peroneal  veins demonstrate normal compressibility, spectral waveform, color  flow and augmentation.                                                                      IMPRESSION:   1. No acute DVT in the right lower extremity.  2. Resolution of previously seen deep vein thrombosis in the right  popliteal, posterior tibial and peroneal veins.  3. The IVC and bilateral iliac veins where visualized are patent.     TUCKER TORRES,      INTERVENTIONAL RADIOLOGY PULMONARY ANGIOGRAM BILATERAL, INTERVENTIONAL  RADIOLOGY INFERIOR VENA CAVA FILTER PLACEMENT   8/12/2020 4:20 PM      CLINICAL HISTORY/INDICATION: Pulmonary embolism, right heart strain.     PROCEDURES PERFORMED:   1. Ultrasound-guided right common femoral venotomy.  2. Right and left main pulmonary angiography with pressures.  3. Bilateral pulmonary artery mechanical thrombectomy with post  thrombectomy angiography.  4. Placement of an ALN temporary IVC filter.     MEDICATIONS: 1% lidocaine, 4 mg Versed IV, 200 mcg Fentanyl IV and  8000 units heparin IV.     CONTRAST: 111 mL Visipaque     FLUORO: 22.9 minutes     AIR KERMA: 59.84 mGy     CONSENT: Following a discussion of the risks, benefits, indications  and alternatives to treatment, appropriate informed consent was  obtained.       TIMEOUT: A timeout was performed per universal protocol policy to  ensure correct patient, site, and procedure to be performed.      PROCEDURE AND FINDINGS:  Following a discussion of the risks, benefits, indications, and  alternatives to treatment, appropriate informed consent was obtained  from the patient. The patient was brought to the interventional  radiology suite and placed supine on the table. Bilateral groins were  prepped and  draped in a sterile fashion.  A timeout was performed per  universal protocol policy to confirm the correct patient, site and  procedure to be performed.     A preliminary ultrasound of the right groin was performed and  demonstrates a patent right common femoral vein. A permanent  ultrasound image was recorded.  Using a combination of fluoroscopy and  ultrasound, an access site was determined.  The overlying skin was  anesthetized with 1% Lidocaine.  Using ultrasound guidance,  access  into the right common femoral vein was obtained with visualization of  needle entry into the vessel. A 0.018 wire was advanced through the  needle and exchange was made for a 5 Guyanese micropuncture sheath. A  0.035 wire was advanced through the micropuncture sheath and exchange  was made for a 6 Guyanese vascular sheath. Then 2 Perclose devices were  deployed in staggered fashion in the typical preclose technique.  A  total of 8000 Units Heparin IV was administered to maintain an ACT of  250-300. The tract was dilated and a 24 Guyanese vascular sheath was  advanced into the inferior vena cava over the wire.     Using a combination of wire and angled pigtail catheter access was  gained into the main pulmonary artery. Using a combination of catheter  and wire access was gained into the left main pulmonary artery.  Digital subtraction angiography was performed, images show a moderate  to large amount of pulmonary embolus in the left lower lobe pulmonary  artery. The pigtail catheter was removed over the wire and exchanged  for the 24 Guyanese FlowTriever.  Over the wire the medium disc was  advanced over the wire into the left lower lobe pulmonary artery and  deployed. The discs were then pulled back into the catheter and  removed over the wire. The FlowTriever was then advanced into the left  lower lobe pulmonary artery and mechanical thrombectomy was performed  with removal of a significant amount of pulmonary embolus. Post  mechanical  thrombectomy angiography was performed, images show  significant improvement in amount of pulmonary emboli.      Using a combination of catheter and wire access was gained into the  right main pulmonary artery. The FlowTriever was then advanced into  the right main pulmonary artery. Digital subtraction angiography was  performed, images show a large amount of thrombus in the right  pulmonary arteries. The FlowTriever was advanced to the level of the  pulmonary emboli and mechanical thrombectomy was performed with  removal of a significant amount of pulmonary embolus. Post aspiration  angiography was performed, images show significant improvement in the  amount of thrombus.      Pulmonary pressures:  Pre treatment: 41 / 8  (20) mmHg  Post Treatment: 23 / 5 (11) mmHg     IVC FILTER PLACEMENT  The FlowTriever catheter was removed. The vascular sheath was pulled  back into the right common iliac vein. The wire was pulled back  through the pulmonary artery into the IVC.     The pigtail flush catheter was then advanced into the inferior vena  cava at the iliac confluence. An inferior venacavogram was performed  which demonstrates conventional caval anatomy without duplication of  the IVC or the renal veins. The inferior vena cava is normal in  caliber and no thrombus or web is identified.     The flush catheter was removed over a wire and the sheath was  exchanged for the filter delivery sheath. The sheath was advanced to  the suprarenal inferior vena cava, the filter was advanced to the end  of the sheath, and the sheath and filter were retracted as a unit for  delivery in an infrarenal position. The filter was deployed.      The sheath was removed and the previously placed Perclose sutures were  tied down in standard fashion. There was still a moderate amount of  bleeding, so an 8 Solomon Islander Angio-Seal was deployed with excellent  hemostasis. Manual compression was held for 10 minutes. The small  incision was closed with a  3-0 Monocryl suture. A sterile dressing was  applied.     Throughout the procedure, the patient was monitored by a radiology  nurse for cardiac rhythm and oxygen saturation which remained stable.  Total moderate sedation time was 116 min. The patient tolerated the  procedure well and left interventional radiology in stable condition.                                                                      IMPRESSION:   1. Successful bilateral pulmonary angiography and mechanical  thrombectomy, with significantly improved pulmonary pressures..  2. 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  (primary encounter diagnosis)  Comment: no IVC or leg thrombus  Plan: Stay on Xarelto until d dimer results are known later today. If normal, stop Xarelto for one month and repeat d dimer off of Xarelto for one month in [place], C one week later to address long term AC cessation for telephone f/u. Decision at that time regarding Xarelto cessation versus continuation but with dose reduction to 10 mg daily.

## 2021-04-06 NOTE — PROGRESS NOTES
"Asher Sandra is a 80 year old female who presents for:  Chief Complaint   Patient presents with     RECHECK      Labs(8:00), US (8:30), ECHO(10:00), CAF(11:10). F/u in clinic with Dr. Bruce.  *vg        Vitals:    Vitals:    04/06/21 1105 04/06/21 1106   BP: 136/89 (!) 145/70   BP Location: Right arm Left arm   Patient Position: Chair Chair   Cuff Size: Adult Regular Adult Regular   Pulse: 65    Resp: 16    SpO2: 97%    Weight: 126 lb (57.2 kg)    Height: 5' 2\" (1.575 m)        BMI:  Estimated body mass index is 23.05 kg/m  as calculated from the following:    Height as of this encounter: 5' 2\" (1.575 m).    Weight as of this encounter: 126 lb (57.2 kg).    Pain Score:  Data Unavailable        Dave Wallace CMA    "

## 2021-04-06 NOTE — TELEPHONE ENCOUNTER
----- Message from Juan Bruce MD sent at 4/6/2021  3:02 PM CDT -----  Regarding: d dimer results on patient?  Hi there. This patient had her d dimer drawn at 7:59 AM. Still listed as in process. Could you call the lab to find out what time the result will be reported in EPIC? If it is not due to be reported in Epic prior to 4 PM, please call the patient and inform her we will not get the results until tomorrow, and that she should continue to take her Xarelto tonight until we get the results tomorrow.    Thanks,    CF

## 2021-04-06 NOTE — TELEPHONE ENCOUNTER
Called [facility] lab to learn status.    Lab was drawn at [facility], transferred to [facility].      Called [facility] lab.    Status still pending at 3:15 PM.  Blood draw is only good for 8 hours - tech looking for specimen.  She will investigate and call me back.    It was frozen and was in a heater to thaw.  Results should be available around 4:00 PM.    Amie Jara RN BSN  [facility]  [phone]

## 2021-04-06 NOTE — TELEPHONE ENCOUNTER
Component      Latest Ref Rng & Units 4/6/2021   D-Dimer      0.0 - 0.50 ug/ml FEU 0.5       Amie Jara RN BSN  [facility]  [phone]

## 2021-04-06 NOTE — TELEPHONE ENCOUNTER
Called d-dimer results to patient.    She should:    Stop Xarelto    Call for d-dimer order to be faxed to a lab near her (to be drawn in one month ~ 5/5 or 5/6    Virtual visit 3+ days after the d-dimer draw.    Amie Jara RN BSN  [facility]  [phone]

## 2021-04-16 ENCOUNTER — TELEPHONE (OUTPATIENT)
Dept: OTHER | Facility: CLINIC | Age: 81
End: 2021-04-16

## 2021-04-21 NOTE — TELEPHONE ENCOUNTER
Pt called, provided fax number to Lab at [facility] F: [phone].   Gen phone: [phone] or [phone].     Faxed d-dimer order to above fax number.    Rightfax confirmed complete 04/21/21 11:12.    Amie Jara RN BSN  [facility]  [phone]

## 2021-04-21 NOTE — TELEPHONE ENCOUNTER
Pt called, provided fax number to Lab at [facility] F: [phone].   Gen phone: [phone] or [phone].     PCP Dr. Jessy Sanchez (sp?) 1st time seen earlier this week.   Ordered CBC with differential, lipid panel, metabolic panel, D-dimer.     Called pt back, left vm noting call back with further information, otherwise Dr. Bruce's RN will be back on Monday to follow up on this.     CESAR ChenN, RN  [facility]  Office:  [phone] Fax: [phone]    
See encounter dated 4/6/21.  Amie Jara RN BSN  [facility]  [phone]      
Statement Selected

## 2021-04-26 ENCOUNTER — TELEPHONE (OUTPATIENT)
Dept: OTHER | Facility: CLINIC | Age: 81
End: 2021-04-26

## 2021-04-26 NOTE — TELEPHONE ENCOUNTER
Patient called to speak with Dr. Bruce's nurse.  Patient states Dr. Bruce's nurse (Amie) was scheduling a lab appointment for the patient on either 05/05 or 05/06 and patient is wondering what day Amie arranged that appointment for at the lab in [facility].  I will route note to nurse to review.

## 2021-04-26 NOTE — TELEPHONE ENCOUNTER
Reminded Shirleyxavier that she is to call the clinic in [place] to make an appointment for her blood draw around 5/5/21.    Provided her our fax number to have the results sent.    Phone appointment scheduled for 5/10/21.    Amei Jara RN BSN  [facility]  [phone]

## 2021-05-05 ENCOUNTER — TRANSFERRED RECORDS (OUTPATIENT)
Dept: HEALTH INFORMATION MANAGEMENT | Facility: CLINIC | Age: 81
End: 2021-05-05

## 2021-05-05 LAB
ALT SERPL-CCNC: 11 UNIT/L (ref 0–52)
AST SERPL-CCNC: 18 UNIT/L (ref 0–39)
CHOLEST SERPL-MCNC: 165 MG/DL
CREAT SERPL-MCNC: 0.68 MG/DL (ref 0.6–1.3)
GFR SERPL CREATININE-BSD FRML MDRD: >60 ML/MIN/1.73M2
GLUCOSE SERPL-MCNC: 86 MG/DL (ref 65–100)
HDLC SERPL-MCNC: 67 MG/DL
LDLC SERPL CALC-MCNC: 86 MG/DL
NONHDLC SERPL-MCNC: 98 MG/DL
POTASSIUM SERPL-SCNC: 4.3 MMOL/L (ref 3.5–5.1)
TRIGL SERPL-MCNC: 59 MG/DL

## 2021-05-06 NOTE — TELEPHONE ENCOUNTER
Called patient to let her know that the results have not been received as of 05/06/21    Amie Jara RN BSN  [facility]  [phone]

## 2021-05-06 NOTE — TELEPHONE ENCOUNTER
[facility]     Who is the name of the provider?:  Dr Bruce     What is the location you see this provider at?:  [place]      Reason for call:  Was Asher's D-dimer results from [place] received?  Need them by 05/10/21 for Dr Bruce appt    :  Asher    Phone number to call:  [phone]    Additional Notes:

## 2021-05-10 ENCOUNTER — VIRTUAL VISIT (OUTPATIENT)
Dept: OTHER | Facility: CLINIC | Age: 81
End: 2021-05-10
Attending: INTERNAL MEDICINE
Payer: MEDICARE

## 2021-05-10 DIAGNOSIS — I82.409 ACUTE DEEP VEIN THROMBOSIS (DVT) OF OTHER VEIN OF LOWER EXTREMITY, UNSPECIFIED LATERALITY (H): Primary | ICD-10-CM

## 2021-05-10 PROCEDURE — 99213 OFFICE O/P EST LOW 20 MIN: CPT | Mod: 95 | Performed by: INTERNAL MEDICINE

## 2021-05-10 NOTE — TELEPHONE ENCOUNTER
Called patient to let her know d-dimer results have still not been received.  Left message - Requested she call me back.      Amie Jara RN BSN  [facility]  [phone]

## 2021-05-10 NOTE — TELEPHONE ENCOUNTER
D-dimer in Care everywhere 5/5/21 - [facility].    Amie Jara RN BSN  [facility]  [phone]

## 2021-05-10 NOTE — PROGRESS NOTES
Asher is a 80 year old who is being evaluated via a billable video visit.      How would you like to obtain your AVS? [facility]  If the video visit is dropped, the invitation should be resent by: Text to cell phone: [phone]   Will anyone else be joining your video visit? No       D-dimer is in Care Everywhere -

## 2021-05-10 NOTE — PROGRESS NOTES
Asher Sandra is a 80 year old female who is presenting at the current time to discuss her diagnosi(es) of            DVT (deep venous thrombosis) (H)  Pulmonary embolism (H)           HPI: Asher Sandra is a 80 year old female who presented 8/10/2020 with an 8 day history of shortness of breath on exertion, chest pain and right lower extremity discomfort. Imaging did show bilateral pulmonary emboli with evidence for right heart strain. BNP and troponin were also elevated. An echocardiogram revealed right heart strain. Ultrasound of the lower extremities was significant for DVT in the right lower extremity from the popliteal vein to calf veins. She was started on IV heparin and admitted to the hospital.      On 8/12/2020 she went tto Interventional Radiology for IVC filter placement and pulmonary artery mechanical thrombectomy.        She was discharged on 8/14/2020 with a prescription for Xarelto as well as Sigvaris 20-30 mmHg thigh high compression stockings, which she is  wearing 20 plus hours per day.      She has had superficial venous thrombosis in the past, with the first episode being in 1967 after childbirth. She has no personal or family history of DVT or known clotting disorders. She has not traveled recently or undergone any surgical procedures. She has been rather active with trying to get her condo ready to move out of. She states she is up to date on mammograms and colonoscopies. No thrombophilia evaluation was therefore warranted.     Since discharge , she has been doing well. F/U imaging as below revealed no IVC or intrafilter thrombus. IVC filter was subsequently retrieved. Duplex now reveals resorption of DVT. TTE reveals no significant pulmonary htn.   When last seen,   Xarelto was stopped as d dimer was undetectable. Her repeat d dimer off of Xarelto for one month in [place] is now still normal.            Review Of Systems  Skin: negative  Eyes: negative  Ears/Nose/Throat:  negative  Respiratory: No shortness of breath, dyspnea on exertion, cough, or hemoptysis  Cardiovascular: negative  Gastrointestinal: negative  Genitourinary: negative  Musculoskeletal: negative  Neurologic: negative  Psychiatric: negative  Hematologic/Lymphatic/Immunologic: negative  Endocrine: negative        PAST MEDICAL HISTORY:                  Past Medical History   No past medical history on file.        PAST SURGICAL HISTORY:                  Past Surgical History         Past Surgical History:   Procedure Laterality Date     IR IVC FILTER PLACEMENT   8/12/2020     IR IVC FILTER REMOVAL   10/6/2020     IR PULMONARY ANGIOGRAM BILATERAL   8/12/2020            CURRENT MEDICATIONS:                  Current Outpatient Prescriptions          Current Outpatient Medications   Medication Sig Dispense Refill     amLODIPine (NORVASC) 5 MG tablet Take 5 mg by mouth 2 times daily         atorvastatin (LIPITOR) 10 MG tablet Take 10 mg by mouth daily         butalbital-APAP-caffeine-codeine (FIORICET WITH CODEINE) -58-30 MG per capsule Take 1 capsule by mouth 2 times daily as needed for migraine (chronic pain)         calcium carbonate 600 mg-vitamin D 400 units (CALTRATE) 600-400 MG-UNIT per tablet Take 1 tablet by mouth daily          carvedilol (COREG) 6.25 MG tablet Take 6.25 mg by mouth 2 times daily (with meals)         fish oil-omega-3 fatty acids 1000 MG capsule Take 1 g by mouth daily          meclizine (ANTIVERT) 25 MG tablet Take 25 mg by mouth 2 times daily as needed for dizziness (vertigo)         omeprazole (PRILOSEC) 20 MG DR capsule Take 20 mg by mouth daily          ondansetron (ZOFRAN) 4 MG tablet Take 4 mg by mouth every 8 hours as needed (vertigo)         oxybutynin ER (DITROPAN-XL) 10 MG 24 hr tablet Take 10 mg by mouth daily         rivaroxaban ANTICOAGULANT (XARELTO) 20 MG TABS tablet Take 1 tablet (20 mg) by mouth daily (with dinner) 10 tablet 0            ALLERGIES:                        Allergies   Allergen Reactions     Sulfa Drugs           SOCIAL HISTORY:                  Social History   Social History            Socioeconomic History     Marital status:        Spouse name: Not on file     Number of children: Not on file     Years of education: Not on file     Highest education level: Not on file   Occupational History     Not on file   Social Needs     Financial resource strain: Not on file     Food insecurity       Worry: Not on file       Inability: Not on file     Transportation needs       Medical: Not on file       Non-medical: Not on file   Tobacco Use     Smoking status: Never Smoker     Smokeless tobacco: Never Used   Substance and Sexual Activity     Alcohol use: Yes       Frequency: 4 or more times a week     Drug use: Never     Sexual activity: Not on file   Lifestyle     Physical activity       Days per week: Not on file       Minutes per session: Not on file     Stress: Not on file   Relationships     Social connections       Talks on phone: Not on file       Gets together: Not on file       Attends [employer] service: Not on file       Active member of club or organization: Not on file       Attends meetings of clubs or organizations: Not on file       Relationship status: Not on file     Intimate partner violence       Fear of current or ex partner: Not on file       Emotionally abused: Not on file       Physically abused: Not on file       Forced sexual activity: Not on file   Other Topics Concern     Not on file   Social History Narrative     Not on file            FAMILY HISTORY:                   Family History   No family history on file.           Physical exam was not undertaken as this was a COVID mandated video visit.    Component      Latest Ref Rng & Units 8/10/2020 4/6/2021   D-Dimer      0.0 - 0.50 ug/ml FEU 3.8 (H) 0.5                    [id number]  [id number]  [id number]  [id number]^BOO^DYLAN^ESTELA     [facility]  [facility]  Heart  Echocardiography Laboratory  [address] [place] [address]  [address]  [place] [address]  Phone [phone]  Fax [phone]     Name: DOROTHY ATKINS  MRN: [id number]  : 1940  Study Date: 2021 09:17 AM  Age: 80 yrs  Gender: Female  Patient Location: [facility]  Reason For Study: Pulmonary embolism (H)  Ordering Physician: DYLAN HADDAD  Referring Physician: DYLAN HADDAD  Performed By: Latrice Barrera     BSA: 1.6 m2  Height: 62 in  Weight: 125 lb  HR: 59  BP: 122/64 mmHg  ______________________________________________________________________________  Procedure  Complete Echo Adult.     ______________________________________________________________________________  Interpretation Summary     1. The left ventricle is normal in structure, function and size. The visual  ejection fraction is estimated at 60%.  2. The right ventricle is normal in structure, function and size.  3. There is mild (1+) tricuspid regurgitation. The right ventricular systolic  pressure is approximated at 20mmHg plus the right atrial pressure.     Echo from 2020 showed EF 60%, moderate RV dilation with mild hypokinesis, 4+  TR, RVSP 50mmHg.  ______________________________________________________________________________  Left Ventricle  The left ventricle is normal in structure, function and size. There is normal  left ventricular wall thickness. The visual ejection fraction is estimated at  60%. Left ventricular diastolic function is normal. Normal left ventricular  wall motion.     Right Ventricle  The right ventricle is normal in structure, function and size.     Atria  Normal left atrial size. Right atrial size is normal. There is no atrial shunt  seen.     Mitral Valve  The mitral valve is normal in structure and function.     Tricuspid Valve  There is mild (1+) tricuspid regurgitation. The right ventricular systolic  pressure is approximated at 20mmHg plus the right atrial  pressure.     Aortic Valve  The aortic valve is normal in structure and function.     Pulmonic Valve  The pulmonic valve is normal in structure and function.     Vessels  Normal ascending, transverse (arch), and descending aorta. The inferior vena  cava was normal in size with preserved respiratory variability.     Pericardium  There is no pericardial effusion.     Rhythm  Sinus rhythm was noted.     ______________________________________________________________________________  MMode/2D Measurements & Calculations  IVSd: 0.84 cm  LVIDd: 4.1 cm  LVIDs: 2.4 cm  LVPWd: 0.87 cm  FS: 41.8 %  LV mass(C)d: 106.5 grams  LV mass(C)dI: 68.0 grams/m2  Ao root diam: 2.8 cm  LA dimension: 3.5 cm     asc Aorta Diam: 3.0 cm  LA/Ao: 1.2  LA Volume (BP): 48.0 ml  LA Volume Index (BP): 30.6 ml/m2  RWT: 0.42  TAPSE: 3.0 cm     Doppler Measurements & Calculations  MV E max karen: 60.6 cm/sec  MV A max karen: 75.2 cm/sec  MV E/A: 0.81  MV dec time: 0.22 sec     TR max karen: 223.3 cm/sec  TR max P.9 mmHg  E/E' av.1  Lateral E/e': 7.3  Medial E/e': 9.0     ______________________________________________________________________________  Report approved by: Henry Bernard 2021 10:28 AM                      Specimen Collected: 21  9:17 AM Last Resulted: 21  9:17 AM Order Details View Encounter Lab and Collection Details Routing Result History              Linked Documents     View Image           All Reviewers List     Juan Bruce MD on 2021 10:36 AM   Encounter     View Encounter           Lab Information     CARDIOLOGY RESULTS        Signed     Electronically signed by Urbano Rea MD on 21 at 1028 CDT               VENOUS ULTRASOUND RIGHT LOWER EXTREMITY 2021 9:06 AM      HISTORY: DVT (deep venous thrombosis) (H). Acute pulmonary embolism  with acute cor pulmonale, unspecified pulmonary embolism type (H).     COMPARISON: 2020.     TECHNIQUE: Color  Doppler and spectral waveform analysis performed  throughout the deep veins of the right lower extremity.     FINDINGS: The right common femoral, proximal greater saphenous,  femoral, and popliteal veins demonstrate normal blood flow,  compression, and augmentation. Posterior tibial and peroneal veins are  compressible. Visualized right external iliac vein is patent.  Contralateral left visualized external iliac and common femoral vein  segments are patent.                                                                      IMPRESSION: Negative for deep venous thrombosis in the right lower  extremity.        PROCEDURE:  1. Inferior venacavogram.  2. Retrieval of an infrarenal inferior vena cava filter.     DATE: 10/6/2020     MODERATE SEDATION: Versed 1.5 mg IV; Fentanyl 75 mcg IV. During the  time out, immediately prior to the administration of medications, the  patient was reassessed for adequacy to receive conscious sedation.   Under physician supervision, Versed and fentanyl were administered for  moderate sedation. Pulse oximetry, heart rate and blood pressure were  continuously monitored by an independent trained observer. The  physician spent 33 minutes of face-to-face sedation time with the  patient.     FLUORO: 3.9 minutes.      AIR KERMA: 24.99 mGy.     CONTRAST: 10 mL Isovue IV.     CLINICAL HISTORY: 80-year-old female with a history of prior  retrievable inferior vena cava filter placement, now presenting for  filter retrieval following anticoagulation.     PROCEDURE/FINDINGS: After informed consent was obtained from the  patient, the patient was brought to the interventional radiology suite  and placed supine. The right neck was prepped and draped in the normal  sterile fashion.      1% lidocaine was used for local anesthesia. Limited preprocedure  ultrasound of the right internal jugular vein was performed, images  show the right internal jugular vein is patent. An image was archived.  Under direct  ultrasound guidance a 21-gauge micropuncture needle was  advanced into the internal jugular vein. A 0.018 wire was advanced  through the needle and exchange was made for a 5 Surinamese micropuncture  sheath.      A 0.035 guidewire was advanced into the inferior vena cava. A 5 Surinamese  flush catheter advanced over the wire into the inferior vena cava  beyond the prior placed filter, and into the inferior vena cava at the  iliac confluence. An inferior venacavogram was performed.     The inferior venacavogram demonstrates conventional caval anatomy  without duplication of the IVC or of the renal veins. No thrombus is  identified within the inferior vena cava or iliac veins. The  pre-existing retrievable inferior vena cava filter was unchanged in  position, and demonstrated no evidence for significant migration or  tilt.     The flush catheter was then removed over a wire, and 9 Surinamese vascular  and 7 Surinamese sheaths were advanced over the wire, with the sheath  immediately above the filter retrieval hook. A gooseneck snare was  then advanced through the sheath, and was utilized to snare the  retrieval hook. The sheath was then advanced over the filter, with  collapse of the filter tines. The filter was then removed with gentle  traction, and removed in entirety.      The patient tolerated the procedure well and there were no immediate  postprocedure complications. The patient's vital signs were monitored  by radiology nursing staff under my supervision and remained stable  throughout the study.                                                                       IMPRESSION: Successful and uncomplicated retrieval of an inferior vena  cava filter.     TUCKER TORRES DO           ULTRASOUND AORTA/IVC/ILIAC DUPLEX COMPLETE;   ULTRASOUND RIGHT LOWER EXTREMITY VENOUS DUPLEX  9/9/2020 10:57 AM     CLINICAL HISTORY/INDICATION: IVC filter. Evaluate for timing of  removal. Acute pulmonary embolism with acute cor  pulmonale,  unspecified pulmonary embolism type (H).     COMPARISON: Ultrasound 8/20/2020     TECHNIQUE:   Grayscale, color-flow, and spectral waveform analysis were performed  of the deep veins of the right lower extremity     FINDINGS: The inferior vena cava is patent without evidence of  thrombus. The right external iliac vein is patent. Left iliac vein is  not visualized. Bilateral external iliac veins and common femoral  veins are patent.     The right femoral vein, popliteal vein, posterior tibial and peroneal  veins demonstrate normal compressibility, spectral waveform, color  flow and augmentation.                                                                      IMPRESSION:   1. No acute DVT in the right lower extremity.  2. Resolution of previously seen deep vein thrombosis in the right  popliteal, posterior tibial and peroneal veins.  3. The IVC and bilateral iliac veins where visualized are patent.     TUCKER TORRES DO     INTERVENTIONAL RADIOLOGY PULMONARY ANGIOGRAM BILATERAL, INTERVENTIONAL  RADIOLOGY INFERIOR VENA CAVA FILTER PLACEMENT   8/12/2020 4:20 PM      CLINICAL HISTORY/INDICATION: Pulmonary embolism, right heart strain.     PROCEDURES PERFORMED:   1. Ultrasound-guided right common femoral venotomy.  2. Right and left main pulmonary angiography with pressures.  3. Bilateral pulmonary artery mechanical thrombectomy with post  thrombectomy angiography.  4. Placement of an ALN temporary IVC filter.     MEDICATIONS: 1% lidocaine, 4 mg Versed IV, 200 mcg Fentanyl IV and  8000 units heparin IV.     CONTRAST: 111 mL Visipaque     FLUORO: 22.9 minutes     AIR KERMA: 59.84 mGy     CONSENT: Following a discussion of the risks, benefits, indications  and alternatives to treatment, appropriate informed consent was  obtained.       TIMEOUT: A timeout was performed per universal protocol policy to  ensure correct patient, site, and procedure to be performed.      PROCEDURE AND FINDINGS:  Following a  discussion of the risks, benefits, indications, and  alternatives to treatment, appropriate informed consent was obtained  from the patient. The patient was brought to the interventional  radiology suite and placed supine on the table. Bilateral groins were  prepped and draped in a sterile fashion.  A timeout was performed per  universal protocol policy to confirm the correct patient, site and  procedure to be performed.     A preliminary ultrasound of the right groin was performed and  demonstrates a patent right common femoral vein. A permanent  ultrasound image was recorded.  Using a combination of fluoroscopy and  ultrasound, an access site was determined.  The overlying skin was  anesthetized with 1% Lidocaine.  Using ultrasound guidance,  access  into the right common femoral vein was obtained with visualization of  needle entry into the vessel. A 0.018 wire was advanced through the  needle and exchange was made for a 5 Brazilian micropuncture sheath. A  0.035 wire was advanced through the micropuncture sheath and exchange  was made for a 6 Brazilian vascular sheath. Then 2 Perclose devices were  deployed in staggered fashion in the typical preclose technique.  A  total of 8000 Units Heparin IV was administered to maintain an ACT of  250-300. The tract was dilated and a 24 Brazilian vascular sheath was  advanced into the inferior vena cava over the wire.     Using a combination of wire and angled pigtail catheter access was  gained into the main pulmonary artery. Using a combination of catheter  and wire access was gained into the left main pulmonary artery.  Digital subtraction angiography was performed, images show a moderate  to large amount of pulmonary embolus in the left lower lobe pulmonary  artery. The pigtail catheter was removed over the wire and exchanged  for the 24 Brazilian FlowTriever.  Over the wire the medium disc was  advanced over the wire into the left lower lobe pulmonary artery and  deployed. The  discs were then pulled back into the catheter and  removed over the wire. The FlowTriever was then advanced into the left  lower lobe pulmonary artery and mechanical thrombectomy was performed  with removal of a significant amount of pulmonary embolus. Post  mechanical thrombectomy angiography was performed, images show  significant improvement in amount of pulmonary emboli.      Using a combination of catheter and wire access was gained into the  right main pulmonary artery. The FlowTriever was then advanced into  the right main pulmonary artery. Digital subtraction angiography was  performed, images show a large amount of thrombus in the right  pulmonary arteries. The FlowTriever was advanced to the level of the  pulmonary emboli and mechanical thrombectomy was performed with  removal of a significant amount of pulmonary embolus. Post aspiration  angiography was performed, images show significant improvement in the  amount of thrombus.      Pulmonary pressures:  Pre treatment: 41 / 8  (20) mmHg  Post Treatment: 23 / 5 (11) mmHg     IVC FILTER PLACEMENT  The FlowTriever catheter was removed. The vascular sheath was pulled  back into the right common iliac vein. The wire was pulled back  through the pulmonary artery into the IVC.     The pigtail flush catheter was then advanced into the inferior vena  cava at the iliac confluence. An inferior venacavogram was performed  which demonstrates conventional caval anatomy without duplication of  the IVC or the renal veins. The inferior vena cava is normal in  caliber and no thrombus or web is identified.     The flush catheter was removed over a wire and the sheath was  exchanged for the filter delivery sheath. The sheath was advanced to  the suprarenal inferior vena cava, the filter was advanced to the end  of the sheath, and the sheath and filter were retracted as a unit for  delivery in an infrarenal position. The filter was deployed.      The sheath was removed and the  previously placed Perclose sutures were  tied down in standard fashion. There was still a moderate amount of  bleeding, so an 8 Faroese Angio-Seal was deployed with excellent  hemostasis. Manual compression was held for 10 minutes. The small  incision was closed with a 3-0 Monocryl suture. A sterile dressing was  applied.     Throughout the procedure, the patient was monitored by a radiology  nurse for cardiac rhythm and oxygen saturation which remained stable.  Total moderate sedation time was 116 min. The patient tolerated the  procedure well and left interventional radiology in stable condition.                                                                      IMPRESSION:   1. Successful bilateral pulmonary angiography and mechanical  thrombectomy, with significantly improved pulmonary pressures..  2. 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  (primary encounter diagnosis)  Comment: no IVC or leg thrombus, d dimer remains normal after having been off of Xarelto for one month.Stay off of Xarelto. If recurs at any time in the future, she will need to remain ACd therapeutically indefinitely.   Plan:

## 2021-05-23 ENCOUNTER — HEALTH MAINTENANCE LETTER (OUTPATIENT)
Age: 81
End: 2021-05-23

## 2021-09-12 ENCOUNTER — HEALTH MAINTENANCE LETTER (OUTPATIENT)
Age: 81
End: 2021-09-12

## 2022-05-23 PROCEDURE — 88305 TISSUE EXAM BY PATHOLOGIST: CPT | Performed by: CLINICAL MEDICAL LABORATORY

## 2022-05-24 ENCOUNTER — LAB REQUISITION (OUTPATIENT)
Dept: LAB | Age: 82
End: 2022-05-24

## 2022-05-24 DIAGNOSIS — Z12.11 ENCOUNTER FOR SCREENING FOR MALIGNANT NEOPLASM OF COLON: ICD-10-CM

## 2022-05-25 LAB
ASR DISCLAIMER: NORMAL
CASE RPRT: NORMAL
CLINICAL INFO: NORMAL
PATH REPORT.FINAL DX SPEC: NORMAL
PATH REPORT.GROSS SPEC: NORMAL

## 2022-06-19 ENCOUNTER — HEALTH MAINTENANCE LETTER (OUTPATIENT)
Age: 82
End: 2022-06-19

## 2022-11-19 ENCOUNTER — HEALTH MAINTENANCE LETTER (OUTPATIENT)
Age: 82
End: 2022-11-19

## 2023-07-02 ENCOUNTER — HEALTH MAINTENANCE LETTER (OUTPATIENT)
Age: 83
End: 2023-07-02

## (undated) RX ORDER — LIDOCAINE HYDROCHLORIDE 10 MG/ML
INJECTION, SOLUTION INFILTRATION; PERINEURAL
Status: DISPENSED
Start: 2020-08-12

## (undated) RX ORDER — HEPARIN SODIUM 1000 [USP'U]/ML
INJECTION, SOLUTION INTRAVENOUS; SUBCUTANEOUS
Status: DISPENSED
Start: 2020-08-12

## (undated) RX ORDER — FENTANYL CITRATE 50 UG/ML
INJECTION, SOLUTION INTRAMUSCULAR; INTRAVENOUS
Status: DISPENSED
Start: 2020-08-12

## (undated) RX ORDER — PROTAMINE SULFATE 10 MG/ML
INJECTION, SOLUTION INTRAVENOUS
Status: DISPENSED
Start: 2020-08-12

## (undated) RX ORDER — LIDOCAINE HYDROCHLORIDE 10 MG/ML
INJECTION, SOLUTION INFILTRATION; PERINEURAL
Status: DISPENSED
Start: 2020-10-06

## (undated) RX ORDER — FENTANYL CITRATE 50 UG/ML
INJECTION, SOLUTION INTRAMUSCULAR; INTRAVENOUS
Status: DISPENSED
Start: 2020-10-06

## (undated) RX ORDER — HEPARIN SODIUM 200 [USP'U]/100ML
INJECTION, SOLUTION INTRAVENOUS
Status: DISPENSED
Start: 2020-08-12

## (undated) RX ORDER — HEPARIN SODIUM 200 [USP'U]/100ML
INJECTION, SOLUTION INTRAVENOUS
Status: DISPENSED
Start: 2020-10-06